# Patient Record
Sex: MALE | Race: WHITE | NOT HISPANIC OR LATINO
[De-identification: names, ages, dates, MRNs, and addresses within clinical notes are randomized per-mention and may not be internally consistent; named-entity substitution may affect disease eponyms.]

---

## 2017-01-04 ENCOUNTER — APPOINTMENT (OUTPATIENT)
Dept: SURGERY | Facility: CLINIC | Age: 62
End: 2017-01-04

## 2017-01-04 VITALS
OXYGEN SATURATION: 98 % | SYSTOLIC BLOOD PRESSURE: 120 MMHG | DIASTOLIC BLOOD PRESSURE: 84 MMHG | HEART RATE: 80 BPM | TEMPERATURE: 98.2 F | RESPIRATION RATE: 16 BRPM

## 2017-01-04 DIAGNOSIS — K63.5 POLYP OF COLON: ICD-10-CM

## 2017-01-04 DIAGNOSIS — K57.90 DIVERTICULOSIS OF INTESTINE, PART UNSPECIFIED, W/OUT PERFORATION OR ABSCESS W/OUT BLEEDING: ICD-10-CM

## 2017-02-02 ENCOUNTER — APPOINTMENT (OUTPATIENT)
Dept: SURGERY | Facility: CLINIC | Age: 62
End: 2017-02-02

## 2017-02-02 VITALS
DIASTOLIC BLOOD PRESSURE: 85 MMHG | SYSTOLIC BLOOD PRESSURE: 119 MMHG | OXYGEN SATURATION: 97 % | HEART RATE: 100 BPM | RESPIRATION RATE: 16 BRPM | TEMPERATURE: 98.4 F

## 2017-03-01 ENCOUNTER — APPOINTMENT (OUTPATIENT)
Dept: ORTHOPEDIC SURGERY | Facility: CLINIC | Age: 62
End: 2017-03-01

## 2017-03-01 VITALS — HEIGHT: 71 IN | BODY MASS INDEX: 39.9 KG/M2 | WEIGHT: 285 LBS

## 2017-03-06 ENCOUNTER — OUTPATIENT (OUTPATIENT)
Dept: OUTPATIENT SERVICES | Facility: HOSPITAL | Age: 62
LOS: 1 days | End: 2017-03-06
Payer: COMMERCIAL

## 2017-03-06 ENCOUNTER — RESULT REVIEW (OUTPATIENT)
Age: 62
End: 2017-03-06

## 2017-03-06 VITALS
SYSTOLIC BLOOD PRESSURE: 119 MMHG | WEIGHT: 281.97 LBS | HEIGHT: 71 IN | RESPIRATION RATE: 20 BRPM | DIASTOLIC BLOOD PRESSURE: 84 MMHG | TEMPERATURE: 98 F | HEART RATE: 87 BPM | OXYGEN SATURATION: 97 %

## 2017-03-06 DIAGNOSIS — Z90.5 ACQUIRED ABSENCE OF KIDNEY: Chronic | ICD-10-CM

## 2017-03-06 DIAGNOSIS — S38.3XXS: ICD-10-CM

## 2017-03-06 DIAGNOSIS — Z98.890 OTHER SPECIFIED POSTPROCEDURAL STATES: Chronic | ICD-10-CM

## 2017-03-06 DIAGNOSIS — G47.33 OBSTRUCTIVE SLEEP APNEA (ADULT) (PEDIATRIC): ICD-10-CM

## 2017-03-06 DIAGNOSIS — I10 ESSENTIAL (PRIMARY) HYPERTENSION: ICD-10-CM

## 2017-03-06 DIAGNOSIS — D49.4 NEOPLASM OF UNSPECIFIED BEHAVIOR OF BLADDER: Chronic | ICD-10-CM

## 2017-03-06 DIAGNOSIS — Z87.11 PERSONAL HISTORY OF PEPTIC ULCER DISEASE: Chronic | ICD-10-CM

## 2017-03-06 DIAGNOSIS — K60.2 ANAL FISSURE, UNSPECIFIED: ICD-10-CM

## 2017-03-06 LAB
ANION GAP SERPL CALC-SCNC: 18 MMOL/L — HIGH (ref 5–17)
BUN SERPL-MCNC: 19 MG/DL — SIGNIFICANT CHANGE UP (ref 7–23)
CALCIUM SERPL-MCNC: 10.2 MG/DL — SIGNIFICANT CHANGE UP (ref 8.4–10.5)
CHLORIDE SERPL-SCNC: 99 MMOL/L — SIGNIFICANT CHANGE UP (ref 96–108)
CO2 SERPL-SCNC: 20 MMOL/L — LOW (ref 22–31)
CREAT SERPL-MCNC: 1.4 MG/DL — HIGH (ref 0.5–1.3)
GLUCOSE SERPL-MCNC: 86 MG/DL — SIGNIFICANT CHANGE UP (ref 70–99)
HCT VFR BLD CALC: 46.9 % — SIGNIFICANT CHANGE UP (ref 39–50)
HCV AB S/CO SERPL IA: 0.17 S/CO — SIGNIFICANT CHANGE UP
HCV AB SERPL-IMP: SIGNIFICANT CHANGE UP
HGB BLD-MCNC: 16.4 G/DL — SIGNIFICANT CHANGE UP (ref 13–17)
HIV 1+2 AB+HIV1 P24 AG SERPL QL IA: SIGNIFICANT CHANGE UP
MCHC RBC-ENTMCNC: 30.3 PG — SIGNIFICANT CHANGE UP (ref 27–34)
MCHC RBC-ENTMCNC: 35 GM/DL — SIGNIFICANT CHANGE UP (ref 32–36)
MCV RBC AUTO: 86.7 FL — SIGNIFICANT CHANGE UP (ref 80–100)
PLATELET # BLD AUTO: 234 K/UL — SIGNIFICANT CHANGE UP (ref 150–400)
POTASSIUM SERPL-MCNC: 3.9 MMOL/L — SIGNIFICANT CHANGE UP (ref 3.5–5.3)
POTASSIUM SERPL-SCNC: 3.9 MMOL/L — SIGNIFICANT CHANGE UP (ref 3.5–5.3)
RBC # BLD: 5.41 M/UL — SIGNIFICANT CHANGE UP (ref 4.2–5.8)
RBC # FLD: 14.7 % — HIGH (ref 10.3–14.5)
SODIUM SERPL-SCNC: 138 MMOL/L — SIGNIFICANT CHANGE UP (ref 135–145)
WBC # BLD: 7.8 K/UL — SIGNIFICANT CHANGE UP (ref 3.8–10.5)
WBC # FLD AUTO: 7.8 K/UL — SIGNIFICANT CHANGE UP (ref 3.8–10.5)

## 2017-03-06 PROCEDURE — 86803 HEPATITIS C AB TEST: CPT

## 2017-03-06 PROCEDURE — 85027 COMPLETE CBC AUTOMATED: CPT

## 2017-03-06 PROCEDURE — 87389 HIV-1 AG W/HIV-1&-2 AB AG IA: CPT

## 2017-03-06 PROCEDURE — 80048 BASIC METABOLIC PNL TOTAL CA: CPT

## 2017-03-06 RX ORDER — LIDOCAINE HCL 20 MG/ML
0.2 VIAL (ML) INJECTION ONCE
Qty: 0 | Refills: 0 | Status: DISCONTINUED | OUTPATIENT
Start: 2017-03-07 | End: 2017-03-22

## 2017-03-06 RX ORDER — SODIUM CHLORIDE 9 MG/ML
3 INJECTION INTRAMUSCULAR; INTRAVENOUS; SUBCUTANEOUS EVERY 8 HOURS
Qty: 0 | Refills: 0 | Status: DISCONTINUED | OUTPATIENT
Start: 2017-03-07 | End: 2017-03-22

## 2017-03-06 NOTE — H&P PST ADULT - PSH
Bladder tumor  s/p surgical excision 2016  History of bleeding ulcers  2014, s/p endoscopy with clipping  History of partial nephrectomy  left 2016  S/P arthroscopy of right knee    S/P left knee arthroscopy

## 2017-03-06 NOTE — H&P PST ADULT - NSANTHOSAYNRD_GEN_A_CORE
No. VEENA screening performed.  STOP BANG Legend: 0-2 = LOW Risk; 3-4 = INTERMEDIATE Risk; 5-8 = HIGH Risk

## 2017-03-06 NOTE — H&P PST ADULT - NEUROLOGICAL DETAILS
alert and oriented x 3/sensation intact/responds to verbal commands/responds to pain/normal strength

## 2017-03-06 NOTE — H&P PST ADULT - HISTORY OF PRESENT ILLNESS
61 year old, obese male, reports experiencing intermittent rectal discomfort and bleeding X 6 months. Presents for sphincterectomy & Partial lateral fissurectomy. 61 year old, obese male, reports experiencing intermittent rectal discomfort and bleeding X 6 months. Presents for sphincterectomy & Partial lateral fissurectomy..  Anal fissure.  Failed Botox injections

## 2017-03-06 NOTE — H&P PST ADULT - PROBLEM SELECTOR PLAN 3
Pt. instructed to take his scheduled dose of edarbyclor morning of procedure with minimal water Pt. instructed to take his scheduled dose of edarbyclor morning of procedure with minimal water. Case reviewed with Dr. Tapia.

## 2017-03-06 NOTE — H&P PST ADULT - PMH
Hyperlipidemia    Hypertension    Hypothyroid    Mass of left thigh  scheduled for biopsy at [facility] next week  VEENA (obstructive sleep apnea)  based on criteria  Renal cancer, left

## 2017-03-07 ENCOUNTER — TRANSCRIPTION ENCOUNTER (OUTPATIENT)
Age: 62
End: 2017-03-07

## 2017-03-07 ENCOUNTER — APPOINTMENT (OUTPATIENT)
Dept: SURGERY | Facility: HOSPITAL | Age: 62
End: 2017-03-07

## 2017-03-07 ENCOUNTER — OUTPATIENT (OUTPATIENT)
Dept: OUTPATIENT SERVICES | Facility: HOSPITAL | Age: 62
LOS: 1 days | End: 2017-03-07
Payer: COMMERCIAL

## 2017-03-07 VITALS
DIASTOLIC BLOOD PRESSURE: 84 MMHG | OXYGEN SATURATION: 98 % | HEART RATE: 87 BPM | SYSTOLIC BLOOD PRESSURE: 119 MMHG | WEIGHT: 281.97 LBS | RESPIRATION RATE: 20 BRPM | HEIGHT: 71 IN | TEMPERATURE: 98 F

## 2017-03-07 VITALS
RESPIRATION RATE: 16 BRPM | DIASTOLIC BLOOD PRESSURE: 86 MMHG | SYSTOLIC BLOOD PRESSURE: 134 MMHG | HEART RATE: 90 BPM | OXYGEN SATURATION: 100 % | TEMPERATURE: 97 F

## 2017-03-07 DIAGNOSIS — D49.4 NEOPLASM OF UNSPECIFIED BEHAVIOR OF BLADDER: Chronic | ICD-10-CM

## 2017-03-07 DIAGNOSIS — Z98.890 OTHER SPECIFIED POSTPROCEDURAL STATES: Chronic | ICD-10-CM

## 2017-03-07 DIAGNOSIS — Z90.5 ACQUIRED ABSENCE OF KIDNEY: Chronic | ICD-10-CM

## 2017-03-07 DIAGNOSIS — Z87.11 PERSONAL HISTORY OF PEPTIC ULCER DISEASE: Chronic | ICD-10-CM

## 2017-03-07 DIAGNOSIS — S38.3XXS: ICD-10-CM

## 2017-03-07 PROCEDURE — 88313 SPECIAL STAINS GROUP 2: CPT

## 2017-03-07 PROCEDURE — C1889: CPT

## 2017-03-07 PROCEDURE — 88313 SPECIAL STAINS GROUP 2: CPT | Mod: 26

## 2017-03-07 PROCEDURE — 88341 IMHCHEM/IMCYTCHM EA ADD ANTB: CPT

## 2017-03-07 PROCEDURE — 46200 REMOVAL OF ANAL FISSURE: CPT

## 2017-03-07 PROCEDURE — 88342 IMHCHEM/IMCYTCHM 1ST ANTB: CPT

## 2017-03-07 PROCEDURE — 88342 IMHCHEM/IMCYTCHM 1ST ANTB: CPT | Mod: 26

## 2017-03-07 PROCEDURE — 88304 TISSUE EXAM BY PATHOLOGIST: CPT | Mod: 26

## 2017-03-07 PROCEDURE — 46275 REMOVE ANAL FIST INTER: CPT

## 2017-03-07 PROCEDURE — 88304 TISSUE EXAM BY PATHOLOGIST: CPT

## 2017-03-07 RX ORDER — SODIUM CHLORIDE 9 MG/ML
1000 INJECTION, SOLUTION INTRAVENOUS
Qty: 0 | Refills: 0 | Status: DISCONTINUED | OUTPATIENT
Start: 2017-03-07 | End: 2017-03-22

## 2017-03-07 RX ORDER — CELECOXIB 200 MG/1
200 CAPSULE ORAL ONCE
Qty: 0 | Refills: 0 | Status: DISCONTINUED | OUTPATIENT
Start: 2017-03-07 | End: 2017-03-22

## 2017-03-07 NOTE — BRIEF OPERATIVE NOTE - POST-OP DX
Abscess of anal or rectal region  03/07/2017    Active  Angelita Domínguez  Fissure, anorectal  03/07/2017    Active  Angelita Domínguez

## 2017-03-07 NOTE — BRIEF OPERATIVE NOTE - PROCEDURE
Drainage of abscess  03/07/2017    Active  [id number]  Fistulotomy, anal  03/07/2017    Active  [id number]  Fissurectomy, with sphincterotomy  03/07/2017    Active  [id number]  Lateral sphincterotomy  03/07/2017    Active  [id number]

## 2017-03-07 NOTE — ASU PATIENT PROFILE, ADULT - PMH
Hyperlipidemia    Hypertension    Hypothyroid    Mass of left thigh  scheduled for biopsy at [facility] next week  VEENA (obstructive sleep apnea)  based on criteria  Renal cancer, left

## 2017-03-07 NOTE — BRIEF OPERATIVE NOTE - OPERATION/FINDINGS
PROCEDURE: Fissurectomy with lateral sphincterotomy, fistulotomy with drainage of chronic abscess and marsupialization    FINDINGS: Patient noted to have small opening of fistulous tract just proximal to fissure.  Fistula probe revealed communication with chronic abscess.  Abscess was drained and cavity marsupialized.

## 2017-03-07 NOTE — ASU DISCHARGE PLAN (ADULT/PEDIATRIC). - NOTIFY
Excessive Diarrhea/Bleeding that does not stop/Unable to Urinate/Inability to Tolerate Liquids or Foods/Persistent Nausea and Vomiting/Pain not relieved by Medications/Fever greater than 101/Swelling that continues

## 2017-03-07 NOTE — ASU DISCHARGE PLAN (ADULT/PEDIATRIC). - MEDICATION SUMMARY - MEDICATIONS TO TAKE
I will START or STAY ON the medications listed below when I get home from the hospital:    oxyCODONE-acetaminophen 5mg-325mg oral tablet  -- 1 tab(s) by mouth every 4-6 hours, As Needed for severe pain MDD:6  -- Caution federal law prohibits the transfer of this drug to any person other  than the person for whom it was prescribed.  May cause drowsiness.  Alcohol may intensify this effect.  Use care when operating dangerous machinery.  This prescription cannot be refilled.  This product contains acetaminophen.  Do not use  with any other product containing acetaminophen to prevent possible liver damage.  Using more of this medication than prescribed may cause serious breathing problems.    -- Indication: For Severe Pain    Lipitor 10 mg oral tablet  -- 1 tab(s) by mouth once a day  -- Indication: For Hyperlipidemia    niacin 500 mg oral capsule  -- 1 cap(s) by mouth once a day  -- Indication: For Supplement    Edarbyclor 40 mg-25 mg oral tablet  -- 1 tab(s) by mouth once a day  -- Indication: For Hypertension    Pepcid 20 mg oral tablet  -- 1 tab(s) by mouth once night before and morning of procedure  -- Indication: For GERD    Co Q-10  -- 300 milligram(s) by mouth once a day  -- Indication: For Supplement    Synthroid 150 mcg (0.15 mg) oral tablet  -- 1 tab(s) by mouth once a day  -- Indication: For Hypothyroidism    Centrum oral tablet  -- 1 tab(s) by mouth once a day  -- Indication: For Supplement    Vitamin D3 1000 intl units oral capsule  -- 1 cap(s) by mouth once a day  -- Indication: For Supplement

## 2017-03-15 ENCOUNTER — FORM ENCOUNTER (OUTPATIENT)
Age: 62
End: 2017-03-15

## 2017-03-15 ENCOUNTER — RESULT REVIEW (OUTPATIENT)
Age: 62
End: 2017-03-15

## 2017-03-15 ENCOUNTER — APPOINTMENT (OUTPATIENT)
Dept: INTERVENTIONAL RADIOLOGY/VASCULAR | Facility: CLINIC | Age: 62
End: 2017-03-15

## 2017-03-15 VITALS
SYSTOLIC BLOOD PRESSURE: 109 MMHG | RESPIRATION RATE: 18 BRPM | OXYGEN SATURATION: 98 % | HEIGHT: 71 IN | HEART RATE: 92 BPM | BODY MASS INDEX: 39.9 KG/M2 | DIASTOLIC BLOOD PRESSURE: 74 MMHG | WEIGHT: 285 LBS

## 2017-03-15 DIAGNOSIS — Z78.9 OTHER SPECIFIED HEALTH STATUS: ICD-10-CM

## 2017-03-15 DIAGNOSIS — Z56.0 UNEMPLOYMENT, UNSPECIFIED: ICD-10-CM

## 2017-03-15 RX ORDER — LEVOTHYROXINE SODIUM 137 UG/1
TABLET ORAL
Refills: 0 | Status: ACTIVE | COMMUNITY

## 2017-03-15 RX ORDER — CALCIUM CARBONATE 260MG(650)
500 TABLET,CHEWABLE ORAL
Refills: 0 | Status: ACTIVE | COMMUNITY

## 2017-03-15 RX ORDER — AZILSARTAN KAMEDOXOMIL AND CHLORTHALIDONE 40; 25 MG/1; MG/1
40-25 TABLET ORAL
Qty: 30 | Refills: 0 | Status: ACTIVE | COMMUNITY
Start: 2016-11-07

## 2017-03-15 RX ORDER — ATORVASTATIN CALCIUM 80 MG/1
TABLET, FILM COATED ORAL
Refills: 0 | Status: ACTIVE | COMMUNITY

## 2017-03-15 SDOH — ECONOMIC STABILITY - INCOME SECURITY: UNEMPLOYMENT, UNSPECIFIED: Z56.0

## 2017-03-16 ENCOUNTER — OUTPATIENT (OUTPATIENT)
Dept: OUTPATIENT SERVICES | Facility: HOSPITAL | Age: 62
LOS: 1 days | End: 2017-03-16
Payer: COMMERCIAL

## 2017-03-16 DIAGNOSIS — Z90.5 ACQUIRED ABSENCE OF KIDNEY: Chronic | ICD-10-CM

## 2017-03-16 DIAGNOSIS — Z87.11 PERSONAL HISTORY OF PEPTIC ULCER DISEASE: Chronic | ICD-10-CM

## 2017-03-16 DIAGNOSIS — Z98.890 OTHER SPECIFIED POSTPROCEDURAL STATES: Chronic | ICD-10-CM

## 2017-03-16 DIAGNOSIS — D49.4 NEOPLASM OF UNSPECIFIED BEHAVIOR OF BLADDER: Chronic | ICD-10-CM

## 2017-03-16 DIAGNOSIS — R22.40 LOCALIZED SWELLING, MASS AND LUMP, UNSPECIFIED LOWER LIMB: ICD-10-CM

## 2017-03-16 LAB — SURGICAL PATHOLOGY STUDY: SIGNIFICANT CHANGE UP

## 2017-03-16 PROCEDURE — 88305 TISSUE EXAM BY PATHOLOGIST: CPT | Mod: 26

## 2017-03-16 PROCEDURE — 20206 BIOPSY MUSCLE PERQ NEEDLE: CPT

## 2017-03-16 PROCEDURE — 77012 CT SCAN FOR NEEDLE BIOPSY: CPT | Mod: 26

## 2017-03-20 LAB — NON-GYNECOLOGICAL CYTOLOGY STUDY: SIGNIFICANT CHANGE UP

## 2017-03-21 DIAGNOSIS — R22.42 LOCALIZED SWELLING, MASS AND LUMP, LEFT LOWER LIMB: ICD-10-CM

## 2017-03-22 ENCOUNTER — APPOINTMENT (OUTPATIENT)
Dept: SURGERY | Facility: CLINIC | Age: 62
End: 2017-03-22

## 2017-03-22 VITALS
RESPIRATION RATE: 18 BRPM | HEART RATE: 77 BPM | OXYGEN SATURATION: 97 % | DIASTOLIC BLOOD PRESSURE: 81 MMHG | TEMPERATURE: 98.6 F | SYSTOLIC BLOOD PRESSURE: 120 MMHG

## 2017-03-22 DIAGNOSIS — Z86.39 PERSONAL HISTORY OF OTHER ENDOCRINE, NUTRITIONAL AND METABOLIC DISEASE: ICD-10-CM

## 2017-03-22 DIAGNOSIS — Z85.51 PERSONAL HISTORY OF MALIGNANT NEOPLASM OF BLADDER: ICD-10-CM

## 2017-03-22 DIAGNOSIS — Z85.528 PERSONAL HISTORY OF OTHER MALIGNANT NEOPLASM OF KIDNEY: ICD-10-CM

## 2017-03-22 DIAGNOSIS — M17.10 UNILATERAL PRIMARY OSTEOARTHRITIS, UNSPECIFIED KNEE: ICD-10-CM

## 2017-03-22 RX ORDER — POLYETHYLENE GLYCOL 3350, SODIUM SULFATE, SODIUM CHLORIDE, POTASSIUM CHLORIDE, ASCORBIC ACID, SODIUM ASCORBATE 7.5-2.691G
100 KIT ORAL
Qty: 1 | Refills: 0 | Status: DISCONTINUED | COMMUNITY
Start: 2016-11-08

## 2017-03-28 ENCOUNTER — APPOINTMENT (OUTPATIENT)
Dept: ORTHOPEDIC SURGERY | Facility: CLINIC | Age: 62
End: 2017-03-28

## 2017-05-04 ENCOUNTER — APPOINTMENT (OUTPATIENT)
Dept: SURGERY | Facility: CLINIC | Age: 62
End: 2017-05-04

## 2017-05-04 VITALS
DIASTOLIC BLOOD PRESSURE: 75 MMHG | HEART RATE: 75 BPM | TEMPERATURE: 97.6 F | RESPIRATION RATE: 16 BRPM | OXYGEN SATURATION: 97 % | SYSTOLIC BLOOD PRESSURE: 109 MMHG

## 2017-05-04 DIAGNOSIS — K60.2 ANAL FISSURE, UNSPECIFIED: ICD-10-CM

## 2017-05-04 DIAGNOSIS — K59.4 ANAL SPASM: ICD-10-CM

## 2017-05-04 DIAGNOSIS — K61.4 INTRASPHINCTERIC ABSCESS: ICD-10-CM

## 2017-08-01 ENCOUNTER — APPOINTMENT (OUTPATIENT)
Dept: ORTHOPEDIC SURGERY | Facility: CLINIC | Age: 62
End: 2017-08-01
Payer: COMMERCIAL

## 2017-08-01 PROCEDURE — 99213 OFFICE O/P EST LOW 20 MIN: CPT

## 2017-08-18 ENCOUNTER — OUTPATIENT (OUTPATIENT)
Dept: OUTPATIENT SERVICES | Facility: HOSPITAL | Age: 62
LOS: 1 days | End: 2017-08-18
Payer: COMMERCIAL

## 2017-08-18 VITALS
HEART RATE: 78 BPM | WEIGHT: 300.05 LBS | DIASTOLIC BLOOD PRESSURE: 80 MMHG | TEMPERATURE: 97 F | SYSTOLIC BLOOD PRESSURE: 106 MMHG | HEIGHT: 70 IN | RESPIRATION RATE: 14 BRPM

## 2017-08-18 DIAGNOSIS — Z98.890 OTHER SPECIFIED POSTPROCEDURAL STATES: Chronic | ICD-10-CM

## 2017-08-18 DIAGNOSIS — Z87.11 PERSONAL HISTORY OF PEPTIC ULCER DISEASE: Chronic | ICD-10-CM

## 2017-08-18 DIAGNOSIS — Z90.5 ACQUIRED ABSENCE OF KIDNEY: Chronic | ICD-10-CM

## 2017-08-18 DIAGNOSIS — D49.4 NEOPLASM OF UNSPECIFIED BEHAVIOR OF BLADDER: Chronic | ICD-10-CM

## 2017-08-18 DIAGNOSIS — D36.9 BENIGN NEOPLASM, UNSPECIFIED SITE: ICD-10-CM

## 2017-08-18 DIAGNOSIS — D21.22 BENIGN NEOPLASM OF CONNECTIVE AND OTHER SOFT TISSUE OF LEFT LOWER LIMB, INCLUDING HIP: ICD-10-CM

## 2017-08-18 DIAGNOSIS — K60.2 ANAL FISSURE, UNSPECIFIED: Chronic | ICD-10-CM

## 2017-08-18 DIAGNOSIS — E66.9 OBESITY, UNSPECIFIED: ICD-10-CM

## 2017-08-18 LAB
BLD GP AB SCN SERPL QL: NEGATIVE — SIGNIFICANT CHANGE UP
BUN SERPL-MCNC: 32 MG/DL — HIGH (ref 7–23)
CALCIUM SERPL-MCNC: 9.6 MG/DL — SIGNIFICANT CHANGE UP (ref 8.4–10.5)
CHLORIDE SERPL-SCNC: 102 MMOL/L — SIGNIFICANT CHANGE UP (ref 98–107)
CO2 SERPL-SCNC: 22 MMOL/L — SIGNIFICANT CHANGE UP (ref 22–31)
CREAT SERPL-MCNC: 1.42 MG/DL — HIGH (ref 0.5–1.3)
GLUCOSE SERPL-MCNC: 94 MG/DL — SIGNIFICANT CHANGE UP (ref 70–99)
HCT VFR BLD CALC: 45.8 % — SIGNIFICANT CHANGE UP (ref 39–50)
HGB BLD-MCNC: 15.5 G/DL — SIGNIFICANT CHANGE UP (ref 13–17)
MCHC RBC-ENTMCNC: 30.4 PG — SIGNIFICANT CHANGE UP (ref 27–34)
MCHC RBC-ENTMCNC: 33.8 % — SIGNIFICANT CHANGE UP (ref 32–36)
MCV RBC AUTO: 89.8 FL — SIGNIFICANT CHANGE UP (ref 80–100)
NRBC # FLD: 0 — SIGNIFICANT CHANGE UP
PLATELET # BLD AUTO: 244 K/UL — SIGNIFICANT CHANGE UP (ref 150–400)
PMV BLD: 10.9 FL — SIGNIFICANT CHANGE UP (ref 7–13)
POTASSIUM SERPL-MCNC: 4.5 MMOL/L — SIGNIFICANT CHANGE UP (ref 3.5–5.3)
POTASSIUM SERPL-SCNC: 4.5 MMOL/L — SIGNIFICANT CHANGE UP (ref 3.5–5.3)
RBC # BLD: 5.1 M/UL — SIGNIFICANT CHANGE UP (ref 4.2–5.8)
RBC # FLD: 14.3 % — SIGNIFICANT CHANGE UP (ref 10.3–14.5)
RH IG SCN BLD-IMP: POSITIVE — SIGNIFICANT CHANGE UP
SODIUM SERPL-SCNC: 140 MMOL/L — SIGNIFICANT CHANGE UP (ref 135–145)
TSH SERPL-MCNC: 7.19 UIU/ML — HIGH (ref 0.27–4.2)
WBC # BLD: 8.36 K/UL — SIGNIFICANT CHANGE UP (ref 3.8–10.5)
WBC # FLD AUTO: 8.36 K/UL — SIGNIFICANT CHANGE UP (ref 3.8–10.5)

## 2017-08-18 PROCEDURE — 93010 ELECTROCARDIOGRAM REPORT: CPT

## 2017-08-18 RX ORDER — NIACIN 50 MG
1 TABLET ORAL
Qty: 0 | Refills: 0 | COMMUNITY

## 2017-08-18 RX ORDER — FAMOTIDINE 10 MG/ML
1 INJECTION INTRAVENOUS
Qty: 0 | Refills: 0 | COMMUNITY

## 2017-08-18 RX ORDER — SODIUM CHLORIDE 9 MG/ML
1000 INJECTION, SOLUTION INTRAVENOUS
Qty: 0 | Refills: 0 | Status: DISCONTINUED | OUTPATIENT
Start: 2017-09-08 | End: 2017-09-09

## 2017-08-18 RX ORDER — CHOLECALCIFEROL (VITAMIN D3) 125 MCG
1 CAPSULE ORAL
Qty: 0 | Refills: 0 | COMMUNITY

## 2017-08-18 NOTE — H&P PST ADULT - NEGATIVE GENERAL SYMPTOMS
no anorexia/no weight loss/no weight gain/no polyphagia/no polyuria/no fever/no sweating/no chills/no malaise/no fatigue/no polydipsia

## 2017-08-18 NOTE — H&P PST ADULT - NEGATIVE CARDIOVASCULAR SYMPTOMS
no chest pain/no peripheral edema/no claudication/no palpitations/no dyspnea on exertion/no orthopnea/no paroxysmal nocturnal dyspnea

## 2017-08-18 NOTE — H&P PST ADULT - NEGATIVE GENERAL GENITOURINARY SYMPTOMS
no dysuria/no bladder infections/no hematuria/no flank pain R/no urinary hesitancy/no flank pain L/normal urinary frequency

## 2017-08-18 NOTE — H&P PST ADULT - RS GEN PE MLT RESP DETAILS PC
no wheezes/no rales/no rhonchi/clear to auscultation bilaterally no rales/no subcutaneous emphysema/no chest wall tenderness/no rhonchi/respirations non-labored/breath sounds equal/clear to auscultation bilaterally/no intercostal retractions/no wheezes/good air movement

## 2017-08-18 NOTE — H&P PST ADULT - PROBLEM SELECTOR PLAN 1
Pt scheduled for left distal thigh exploration wide resection soft tissue mass on 9/8/2017.  labs done results pending, ekg done.  Hibiclens provided.  Preop teaching done, pt able to verbalize understanding

## 2017-08-18 NOTE — H&P PST ADULT - GASTROINTESTINAL DETAILS
soft/nontender nontender/no organomegaly/soft/no rebound tenderness/no rigidity/no guarding/obese/no masses palpable/bowel sounds normal/no bruit

## 2017-08-18 NOTE — H&P PST ADULT - PSH
Bladder tumor  s/p surgical excision 2016  History of bleeding ulcers  2014, s/p endoscopy with clipping  History of partial nephrectomy  left 2016  S/P arthroscopy of right knee    S/P left knee arthroscopy Anal fissure  3/2017  Bladder tumor  s/p surgical excision 2016  History of bleeding ulcers  2014, s/p endoscopy with clipping, secondary to mobic  History of partial nephrectomy  left 2016  S/P arthroscopy of right knee    S/P left knee arthroscopy

## 2017-08-18 NOTE — H&P PST ADULT - HISTORY OF PRESENT ILLNESS
63y/o male scheduled for left distal thigh exploration wide resection soft tissue mass on 9/8/2017.  Pt states, "has left thigh mass for a year, MRI shows mass is growing in size."

## 2017-08-18 NOTE — H&P PST ADULT - PMH
Hyperlipidemia    Hypertension    Hypothyroid    Renal cancer, left  denies chemo and radiation Benign neoplasm    Hyperlipidemia    Hypertension    Hypothyroid    Obese    Renal cancer, left  denies chemo and radiation 8/2016

## 2017-09-07 NOTE — ASU PATIENT PROFILE, ADULT - PSH
Anal fissure  3/2017  Bladder tumor  s/p surgical excision 2016  History of bleeding ulcers  2014, s/p endoscopy with clipping, secondary to mobic  History of partial nephrectomy  left 2016  S/P arthroscopy of right knee    S/P left knee arthroscopy

## 2017-09-07 NOTE — ASU PATIENT PROFILE, ADULT - PMH
Benign neoplasm    Hyperlipidemia    Hypertension    Hypothyroid    Obese    Renal cancer, left  denies chemo and radiation 8/2016

## 2017-09-08 ENCOUNTER — RESULT REVIEW (OUTPATIENT)
Age: 62
End: 2017-09-08

## 2017-09-08 ENCOUNTER — INPATIENT (INPATIENT)
Facility: HOSPITAL | Age: 62
LOS: 0 days | Discharge: HOME | End: 2017-09-09
Attending: ORTHOPAEDIC SURGERY | Admitting: ORTHOPAEDIC SURGERY
Payer: COMMERCIAL

## 2017-09-08 ENCOUNTER — TRANSCRIPTION ENCOUNTER (OUTPATIENT)
Age: 62
End: 2017-09-08

## 2017-09-08 ENCOUNTER — APPOINTMENT (OUTPATIENT)
Dept: ORTHOPEDIC SURGERY | Facility: HOSPITAL | Age: 62
End: 2017-09-08

## 2017-09-08 VITALS
TEMPERATURE: 97 F | HEART RATE: 87 BPM | SYSTOLIC BLOOD PRESSURE: 130 MMHG | RESPIRATION RATE: 18 BRPM | DIASTOLIC BLOOD PRESSURE: 85 MMHG | WEIGHT: 300.05 LBS | HEIGHT: 70 IN | OXYGEN SATURATION: 97 %

## 2017-09-08 DIAGNOSIS — Z98.890 OTHER SPECIFIED POSTPROCEDURAL STATES: Chronic | ICD-10-CM

## 2017-09-08 DIAGNOSIS — K60.2 ANAL FISSURE, UNSPECIFIED: Chronic | ICD-10-CM

## 2017-09-08 DIAGNOSIS — D49.4 NEOPLASM OF UNSPECIFIED BEHAVIOR OF BLADDER: Chronic | ICD-10-CM

## 2017-09-08 DIAGNOSIS — Z87.11 PERSONAL HISTORY OF PEPTIC ULCER DISEASE: Chronic | ICD-10-CM

## 2017-09-08 DIAGNOSIS — D21.22 BENIGN NEOPLASM OF CONNECTIVE AND OTHER SOFT TISSUE OF LEFT LOWER LIMB, INCLUDING HIP: ICD-10-CM

## 2017-09-08 DIAGNOSIS — Z90.5 ACQUIRED ABSENCE OF KIDNEY: Chronic | ICD-10-CM

## 2017-09-08 LAB — RH IG SCN BLD-IMP: POSITIVE — SIGNIFICANT CHANGE UP

## 2017-09-08 PROCEDURE — 88331 PATH CONSLTJ SURG 1 BLK 1SPC: CPT | Mod: 26

## 2017-09-08 PROCEDURE — 88305 TISSUE EXAM BY PATHOLOGIST: CPT | Mod: 26

## 2017-09-08 PROCEDURE — 27364 RESECT THIGH/KNEE TUM 5 CM/>: CPT | Mod: LT

## 2017-09-08 PROCEDURE — 88342 IMHCHEM/IMCYTCHM 1ST ANTB: CPT | Mod: 26

## 2017-09-08 PROCEDURE — 88341 IMHCHEM/IMCYTCHM EA ADD ANTB: CPT | Mod: 26

## 2017-09-08 RX ORDER — OXYCODONE AND ACETAMINOPHEN 5; 325 MG/1; MG/1
1 TABLET ORAL EVERY 4 HOURS
Qty: 0 | Refills: 0 | Status: DISCONTINUED | OUTPATIENT
Start: 2017-09-08 | End: 2017-09-08

## 2017-09-08 RX ORDER — OXYCODONE AND ACETAMINOPHEN 5; 325 MG/1; MG/1
2 TABLET ORAL EVERY 6 HOURS
Qty: 0 | Refills: 0 | Status: DISCONTINUED | OUTPATIENT
Start: 2017-09-08 | End: 2017-09-08

## 2017-09-08 RX ORDER — ONDANSETRON 8 MG/1
4 TABLET, FILM COATED ORAL ONCE
Qty: 0 | Refills: 0 | Status: COMPLETED | OUTPATIENT
Start: 2017-09-08 | End: 2017-09-08

## 2017-09-08 RX ORDER — OXYCODONE HYDROCHLORIDE 5 MG/1
5 TABLET ORAL EVERY 4 HOURS
Qty: 0 | Refills: 0 | Status: DISCONTINUED | OUTPATIENT
Start: 2017-09-08 | End: 2017-09-09

## 2017-09-08 RX ORDER — LEVOTHYROXINE SODIUM 125 MCG
150 TABLET ORAL DAILY
Qty: 0 | Refills: 0 | Status: DISCONTINUED | OUTPATIENT
Start: 2017-09-08 | End: 2017-09-09

## 2017-09-08 RX ORDER — HYDROMORPHONE HYDROCHLORIDE 2 MG/ML
2 INJECTION INTRAMUSCULAR; INTRAVENOUS; SUBCUTANEOUS EVERY 4 HOURS
Qty: 0 | Refills: 0 | Status: DISCONTINUED | OUTPATIENT
Start: 2017-09-08 | End: 2017-09-09

## 2017-09-08 RX ORDER — ASPIRIN/CALCIUM CARB/MAGNESIUM 324 MG
325 TABLET ORAL DAILY
Qty: 0 | Refills: 0 | Status: DISCONTINUED | OUTPATIENT
Start: 2017-09-08 | End: 2017-09-09

## 2017-09-08 RX ORDER — ONDANSETRON 8 MG/1
4 TABLET, FILM COATED ORAL EVERY 6 HOURS
Qty: 0 | Refills: 0 | Status: DISCONTINUED | OUTPATIENT
Start: 2017-09-08 | End: 2017-09-09

## 2017-09-08 RX ORDER — CHLORTHALIDONE 50 MG
25 TABLET ORAL DAILY
Qty: 0 | Refills: 0 | Status: DISCONTINUED | OUTPATIENT
Start: 2017-09-08 | End: 2017-09-09

## 2017-09-08 RX ORDER — MORPHINE SULFATE 50 MG/1
4 CAPSULE, EXTENDED RELEASE ORAL EVERY 4 HOURS
Qty: 0 | Refills: 0 | Status: DISCONTINUED | OUTPATIENT
Start: 2017-09-08 | End: 2017-09-08

## 2017-09-08 RX ORDER — HYDROMORPHONE HYDROCHLORIDE 2 MG/ML
1 INJECTION INTRAMUSCULAR; INTRAVENOUS; SUBCUTANEOUS
Qty: 0 | Refills: 0 | Status: DISCONTINUED | OUTPATIENT
Start: 2017-09-08 | End: 2017-09-08

## 2017-09-08 RX ORDER — AZILSARTAN KAMEDOXOMIL AND CHLORTHALIDONE 40; 12.5 MG/1; MG/1
1 TABLET ORAL
Qty: 0 | Refills: 0 | COMMUNITY

## 2017-09-08 RX ORDER — INFLUENZA VIRUS VACCINE 15; 15; 15; 15 UG/.5ML; UG/.5ML; UG/.5ML; UG/.5ML
0.5 SUSPENSION INTRAMUSCULAR ONCE
Qty: 0 | Refills: 0 | Status: COMPLETED | OUTPATIENT
Start: 2017-09-08 | End: 2017-09-08

## 2017-09-08 RX ORDER — OXYCODONE HYDROCHLORIDE 5 MG/1
10 TABLET ORAL EVERY 4 HOURS
Qty: 0 | Refills: 0 | Status: DISCONTINUED | OUTPATIENT
Start: 2017-09-08 | End: 2017-09-09

## 2017-09-08 RX ORDER — ATORVASTATIN CALCIUM 80 MG/1
10 TABLET, FILM COATED ORAL AT BEDTIME
Qty: 0 | Refills: 0 | Status: DISCONTINUED | OUTPATIENT
Start: 2017-09-08 | End: 2017-09-09

## 2017-09-08 RX ORDER — ACETAMINOPHEN 500 MG
650 TABLET ORAL EVERY 6 HOURS
Qty: 0 | Refills: 0 | Status: DISCONTINUED | OUTPATIENT
Start: 2017-09-08 | End: 2017-09-09

## 2017-09-08 RX ORDER — HYDROMORPHONE HYDROCHLORIDE 2 MG/ML
0.5 INJECTION INTRAMUSCULAR; INTRAVENOUS; SUBCUTANEOUS
Qty: 0 | Refills: 0 | Status: DISCONTINUED | OUTPATIENT
Start: 2017-09-08 | End: 2017-09-09

## 2017-09-08 RX ORDER — LOSARTAN POTASSIUM 100 MG/1
25 TABLET, FILM COATED ORAL DAILY
Qty: 0 | Refills: 0 | Status: DISCONTINUED | OUTPATIENT
Start: 2017-09-08 | End: 2017-09-09

## 2017-09-08 RX ORDER — SODIUM CHLORIDE 9 MG/ML
1000 INJECTION, SOLUTION INTRAVENOUS
Qty: 0 | Refills: 0 | Status: DISCONTINUED | OUTPATIENT
Start: 2017-09-08 | End: 2017-09-09

## 2017-09-08 RX ORDER — HYDROMORPHONE HYDROCHLORIDE 2 MG/ML
0.5 INJECTION INTRAMUSCULAR; INTRAVENOUS; SUBCUTANEOUS
Qty: 0 | Refills: 0 | Status: CANCELLED | OUTPATIENT
Start: 2017-09-16 | End: 2017-09-09

## 2017-09-08 RX ADMIN — OXYCODONE HYDROCHLORIDE 10 MILLIGRAM(S): 5 TABLET ORAL at 19:20

## 2017-09-08 RX ADMIN — HYDROMORPHONE HYDROCHLORIDE 1 MILLIGRAM(S): 2 INJECTION INTRAMUSCULAR; INTRAVENOUS; SUBCUTANEOUS at 11:34

## 2017-09-08 RX ADMIN — Medication 325 MILLIGRAM(S): at 17:44

## 2017-09-08 RX ADMIN — HYDROMORPHONE HYDROCHLORIDE 1 MILLIGRAM(S): 2 INJECTION INTRAMUSCULAR; INTRAVENOUS; SUBCUTANEOUS at 12:02

## 2017-09-08 RX ADMIN — ATORVASTATIN CALCIUM 10 MILLIGRAM(S): 80 TABLET, FILM COATED ORAL at 22:00

## 2017-09-08 RX ADMIN — SODIUM CHLORIDE 30 MILLILITER(S): 9 INJECTION, SOLUTION INTRAVENOUS at 07:53

## 2017-09-08 RX ADMIN — OXYCODONE HYDROCHLORIDE 10 MILLIGRAM(S): 5 TABLET ORAL at 18:27

## 2017-09-08 RX ADMIN — HYDROMORPHONE HYDROCHLORIDE 1 MILLIGRAM(S): 2 INJECTION INTRAMUSCULAR; INTRAVENOUS; SUBCUTANEOUS at 11:49

## 2017-09-08 RX ADMIN — HYDROMORPHONE HYDROCHLORIDE 1 MILLIGRAM(S): 2 INJECTION INTRAMUSCULAR; INTRAVENOUS; SUBCUTANEOUS at 12:56

## 2017-09-08 RX ADMIN — OXYCODONE HYDROCHLORIDE 10 MILLIGRAM(S): 5 TABLET ORAL at 13:44

## 2017-09-08 RX ADMIN — HYDROMORPHONE HYDROCHLORIDE 1 MILLIGRAM(S): 2 INJECTION INTRAMUSCULAR; INTRAVENOUS; SUBCUTANEOUS at 12:03

## 2017-09-08 RX ADMIN — ONDANSETRON 4 MILLIGRAM(S): 8 TABLET, FILM COATED ORAL at 19:30

## 2017-09-08 RX ADMIN — HYDROMORPHONE HYDROCHLORIDE 1 MILLIGRAM(S): 2 INJECTION INTRAMUSCULAR; INTRAVENOUS; SUBCUTANEOUS at 12:41

## 2017-09-08 RX ADMIN — HYDROMORPHONE HYDROCHLORIDE 1 MILLIGRAM(S): 2 INJECTION INTRAMUSCULAR; INTRAVENOUS; SUBCUTANEOUS at 12:19

## 2017-09-08 RX ADMIN — HYDROMORPHONE HYDROCHLORIDE 1 MILLIGRAM(S): 2 INJECTION INTRAMUSCULAR; INTRAVENOUS; SUBCUTANEOUS at 11:48

## 2017-09-08 RX ADMIN — SODIUM CHLORIDE 75 MILLILITER(S): 9 INJECTION, SOLUTION INTRAVENOUS at 11:34

## 2017-09-08 RX ADMIN — OXYCODONE HYDROCHLORIDE 10 MILLIGRAM(S): 5 TABLET ORAL at 13:09

## 2017-09-09 ENCOUNTER — TRANSCRIPTION ENCOUNTER (OUTPATIENT)
Age: 62
End: 2017-09-09

## 2017-09-09 VITALS — WEIGHT: 168.87 LBS

## 2017-09-09 LAB
BASOPHILS # BLD AUTO: 0.02 K/UL — SIGNIFICANT CHANGE UP (ref 0–0.2)
BASOPHILS NFR BLD AUTO: 0.2 % — SIGNIFICANT CHANGE UP (ref 0–2)
BUN SERPL-MCNC: 23 MG/DL — SIGNIFICANT CHANGE UP (ref 7–23)
CALCIUM SERPL-MCNC: 9.3 MG/DL — SIGNIFICANT CHANGE UP (ref 8.4–10.5)
CHLORIDE SERPL-SCNC: 101 MMOL/L — SIGNIFICANT CHANGE UP (ref 98–107)
CO2 SERPL-SCNC: 23 MMOL/L — SIGNIFICANT CHANGE UP (ref 22–31)
CREAT SERPL-MCNC: 1.42 MG/DL — HIGH (ref 0.5–1.3)
EOSINOPHIL # BLD AUTO: 0 K/UL — SIGNIFICANT CHANGE UP (ref 0–0.5)
EOSINOPHIL NFR BLD AUTO: 0 % — SIGNIFICANT CHANGE UP (ref 0–6)
GLUCOSE SERPL-MCNC: 126 MG/DL — HIGH (ref 70–99)
HCT VFR BLD CALC: 43.8 % — SIGNIFICANT CHANGE UP (ref 39–50)
HGB BLD-MCNC: 14.4 G/DL — SIGNIFICANT CHANGE UP (ref 13–17)
IMM GRANULOCYTES # BLD AUTO: 0.05 # — SIGNIFICANT CHANGE UP
IMM GRANULOCYTES NFR BLD AUTO: 0.4 % — SIGNIFICANT CHANGE UP (ref 0–1.5)
LYMPHOCYTES # BLD AUTO: 1.46 K/UL — SIGNIFICANT CHANGE UP (ref 1–3.3)
LYMPHOCYTES # BLD AUTO: 11.7 % — LOW (ref 13–44)
MCHC RBC-ENTMCNC: 29.4 PG — SIGNIFICANT CHANGE UP (ref 27–34)
MCHC RBC-ENTMCNC: 32.9 % — SIGNIFICANT CHANGE UP (ref 32–36)
MCV RBC AUTO: 89.4 FL — SIGNIFICANT CHANGE UP (ref 80–100)
MONOCYTES # BLD AUTO: 1.27 K/UL — HIGH (ref 0–0.9)
MONOCYTES NFR BLD AUTO: 10.1 % — SIGNIFICANT CHANGE UP (ref 2–14)
NEUTROPHILS # BLD AUTO: 9.73 K/UL — HIGH (ref 1.8–7.4)
NEUTROPHILS NFR BLD AUTO: 77.6 % — HIGH (ref 43–77)
NRBC # FLD: 0 — SIGNIFICANT CHANGE UP
PLATELET # BLD AUTO: 250 K/UL — SIGNIFICANT CHANGE UP (ref 150–400)
PMV BLD: 10.5 FL — SIGNIFICANT CHANGE UP (ref 7–13)
POTASSIUM SERPL-MCNC: 4 MMOL/L — SIGNIFICANT CHANGE UP (ref 3.5–5.3)
POTASSIUM SERPL-SCNC: 4 MMOL/L — SIGNIFICANT CHANGE UP (ref 3.5–5.3)
RBC # BLD: 4.9 M/UL — SIGNIFICANT CHANGE UP (ref 4.2–5.8)
RBC # FLD: 14.4 % — SIGNIFICANT CHANGE UP (ref 10.3–14.5)
SODIUM SERPL-SCNC: 141 MMOL/L — SIGNIFICANT CHANGE UP (ref 135–145)
WBC # BLD: 12.53 K/UL — HIGH (ref 3.8–10.5)
WBC # FLD AUTO: 12.53 K/UL — HIGH (ref 3.8–10.5)

## 2017-09-09 RX ORDER — OXYCODONE HYDROCHLORIDE 5 MG/1
1 TABLET ORAL
Qty: 16 | Refills: 0 | OUTPATIENT
Start: 2017-09-09 | End: 2017-09-13

## 2017-09-09 RX ORDER — OXYCODONE HYDROCHLORIDE 5 MG/1
1 TABLET ORAL
Qty: 16 | Refills: 0
Start: 2017-09-09 | End: 2017-09-13

## 2017-09-09 RX ORDER — ASPIRIN/CALCIUM CARB/MAGNESIUM 324 MG
1 TABLET ORAL
Qty: 30 | Refills: 0 | OUTPATIENT
Start: 2017-09-09 | End: 2017-10-09

## 2017-09-09 RX ORDER — ASPIRIN/CALCIUM CARB/MAGNESIUM 324 MG
1 TABLET ORAL
Qty: 30 | Refills: 0
Start: 2017-09-09 | End: 2017-10-09

## 2017-09-09 RX ADMIN — OXYCODONE HYDROCHLORIDE 10 MILLIGRAM(S): 5 TABLET ORAL at 06:00

## 2017-09-09 RX ADMIN — Medication 325 MILLIGRAM(S): at 12:16

## 2017-09-09 RX ADMIN — OXYCODONE HYDROCHLORIDE 10 MILLIGRAM(S): 5 TABLET ORAL at 17:00

## 2017-09-09 RX ADMIN — OXYCODONE HYDROCHLORIDE 10 MILLIGRAM(S): 5 TABLET ORAL at 06:30

## 2017-09-09 RX ADMIN — OXYCODONE HYDROCHLORIDE 10 MILLIGRAM(S): 5 TABLET ORAL at 10:30

## 2017-09-09 RX ADMIN — OXYCODONE HYDROCHLORIDE 10 MILLIGRAM(S): 5 TABLET ORAL at 10:00

## 2017-09-09 RX ADMIN — Medication 150 MICROGRAM(S): at 05:15

## 2017-09-09 NOTE — DISCHARGE NOTE ADULT - NS AS ACTIVITY OBS
Do not drive or operate machinery/Do not make important decisions/Do not drive while taking pain medication

## 2017-09-09 NOTE — DISCHARGE NOTE ADULT - INSTRUCTIONS
Maintain incision clean and dry, call MD with any signs of infection such as fever, redness or drainage from site. You may resume a regular diet Maintain incision clean and dry, call MD with any signs of infection such as fever, redness or drainage from site.  Take over the counter stool softener to prevent constipation that can be a side effect from taking pain medications Maintain incision clean and dry, call MD with any signs of infection such as fever, redness or drainage from site.  Take over the counter stool softener to prevent constipation that can be a side effect from taking pain medications  Walker given to patient.

## 2017-09-09 NOTE — DISCHARGE NOTE ADULT - CARE PLAN
Principal Discharge DX:	Benign neoplasm  Goal:	Resolution of pain  Instructions for follow-up, activity and diet:	Please follow up with Dr. Santana within 1-2 weeks. You may call [phone] to schedule an appointment.    You may resume a regular diet and regular activities. Please do not participate in heavy lifting or strenuous exercise. Do not drive while taking pain medication.    Please go to the emergency department if you experience pain not controlled by pain medication, bleeding that will not stop, fevers or chills.

## 2017-09-09 NOTE — DISCHARGE NOTE ADULT - NS AS DC FOLLOWUP STROKE INST
Smoking Cessation I & D, aspirin, oxycodone I & D, aspirin, oxycodone/Influenza vaccination (VIS Pub Date: August 19, 2014)

## 2017-09-09 NOTE — DISCHARGE NOTE ADULT - PATIENT PORTAL LINK FT
“You can access the Follow[facility] Patient Portal, offered by [facility], by registering with the following website: [email or web address]”

## 2017-09-09 NOTE — DIETITIAN INITIAL EVALUATION ADULT. - OTHER INFO
Pt referred for BMI greater than 40.  At this time, pt states he is eating well, with no issues relating to food allergies, difficulties chewing/swallowing or modified diet PTA.  He relates that he has had a recent 15lb wt gain 2/2 synthroid dosage being too low.  Pt to be discharged today.

## 2017-09-09 NOTE — DISCHARGE NOTE ADULT - PLAN OF CARE
Resolution of pain Please follow up with Dr. Santana within 1-2 weeks. You may call [phone] to schedule an appointment.    You may resume a regular diet and regular activities. Please do not participate in heavy lifting or strenuous exercise. Do not drive while taking pain medication.    Please go to the emergency department if you experience pain not controlled by pain medication, bleeding that will not stop, fevers or chills.

## 2017-09-09 NOTE — DISCHARGE NOTE ADULT - HOSPITAL COURSE
62 year old male presented to [facility] for resection of left thigh mass. Patient underwent left thigh mass resection on 9/8/17. He tolerated the procedure well. Postoperatively, he was tolerating diet, ambulating well and vitals were stable. He was deemed stable for discharge.

## 2017-09-09 NOTE — DOWNTIME INTERRUPTION NOTE - WHICH MANUAL FORMS INITIATED?
See paper chart for clinical documentation recorded during the downtime.  V/S, I/O flowsheet  Accucheck Record  MAR

## 2017-09-09 NOTE — DISCHARGE NOTE ADULT - CONDITIONS AT DISCHARGE
Thigh dressing clean dry and intact. Positive NV status. VS Afebrile. pt james po diet well and voiding without difficulty. seen by MD and cleared for Dc to home as per safe Dc plan.

## 2017-09-09 NOTE — DISCHARGE NOTE ADULT - MEDICATION SUMMARY - MEDICATIONS TO TAKE
I will START or STAY ON the medications listed below when I get home from the hospital:    Rolling Walker  -- 1 unit(s). For use following thigh mass resection  -- Indication: For Walking    oxyCODONE 5 mg oral tablet  -- 1 tab(s) by mouth every 6 hours, As Needed -Mild Pain (1 - 3) MDD:4 per day  -- Indication: For Pain    aspirin 325 mg oral delayed release tablet  -- 1 tab(s) by mouth once a day  -- Indication: For DVT prophylaxis    azilsartan 40 mg oral tablet  -- 1 tab(s) by mouth once a day  -- Indication: For HTN    Lipitor 10 mg oral tablet  -- 1 tab(s) by mouth once a day  -- Indication: For HLD    Edarbyclor 40 mg-25 mg oral tablet  -- 1 tab(s) by mouth once a day  -- Indication: For HTN    chlorthalidone 25 mg oral tablet  -- 1 tab(s) by mouth once a day  -- Indication: For HTN    Co Q-10  -- 300 milligram(s) by mouth once a day  -- Indication: For Supplement    Synthroid 150 mcg (0.15 mg) oral tablet  -- 1 tab(s) by mouth once a day  -- Indication: For Hypothyroidism    Centrum oral tablet  -- 1 tab(s) by mouth once a day  -- Indication: For Supplement

## 2017-09-15 LAB — SURGICAL PATHOLOGY STUDY: SIGNIFICANT CHANGE UP

## 2017-09-16 ENCOUNTER — TRANSCRIPTION ENCOUNTER (OUTPATIENT)
Age: 62
End: 2017-09-16

## 2017-09-19 ENCOUNTER — APPOINTMENT (OUTPATIENT)
Dept: ORTHOPEDIC SURGERY | Facility: CLINIC | Age: 62
End: 2017-09-19
Payer: COMMERCIAL

## 2017-09-19 PROCEDURE — 99024 POSTOP FOLLOW-UP VISIT: CPT

## 2017-12-19 ENCOUNTER — APPOINTMENT (OUTPATIENT)
Dept: ORTHOPEDIC SURGERY | Facility: CLINIC | Age: 62
End: 2017-12-19
Payer: COMMERCIAL

## 2017-12-19 PROCEDURE — 99213 OFFICE O/P EST LOW 20 MIN: CPT

## 2018-07-16 PROBLEM — C64.2 MALIGNANT NEOPLASM OF LEFT KIDNEY, EXCEPT RENAL PELVIS: Chronic | Status: ACTIVE | Noted: 2017-03-06

## 2018-07-16 PROBLEM — G47.33 OBSTRUCTIVE SLEEP APNEA (ADULT) (PEDIATRIC): Chronic | Status: INACTIVE | Noted: 2017-03-06 | Resolved: 2017-08-18

## 2018-07-16 PROBLEM — R22.42 LOCALIZED SWELLING, MASS AND LUMP, LEFT LOWER LIMB: Chronic | Status: INACTIVE | Noted: 2017-03-06 | Resolved: 2017-08-18

## 2018-09-26 PROBLEM — E03.9 HYPOTHYROIDISM, UNSPECIFIED: Chronic | Status: ACTIVE | Noted: 2017-03-06

## 2018-09-26 PROBLEM — E78.5 HYPERLIPIDEMIA, UNSPECIFIED: Chronic | Status: ACTIVE | Noted: 2017-03-06

## 2018-09-26 PROBLEM — I10 ESSENTIAL (PRIMARY) HYPERTENSION: Chronic | Status: ACTIVE | Noted: 2017-03-06

## 2018-09-26 PROBLEM — D36.9 BENIGN NEOPLASM, UNSPECIFIED SITE: Chronic | Status: ACTIVE | Noted: 2017-08-18

## 2018-09-26 PROBLEM — E66.9 OBESITY, UNSPECIFIED: Chronic | Status: ACTIVE | Noted: 2017-08-18

## 2018-10-03 ENCOUNTER — APPOINTMENT (OUTPATIENT)
Dept: ORTHOPEDIC SURGERY | Facility: CLINIC | Age: 63
End: 2018-10-03
Payer: COMMERCIAL

## 2018-10-03 DIAGNOSIS — M79.9 SOFT TISSUE DISORDER, UNSPECIFIED: ICD-10-CM

## 2018-10-03 PROCEDURE — 99213 OFFICE O/P EST LOW 20 MIN: CPT

## 2019-04-18 ENCOUNTER — TRANSCRIPTION ENCOUNTER (OUTPATIENT)
Age: 64
End: 2019-04-18

## 2021-01-07 DIAGNOSIS — Z01.818 ENCOUNTER FOR OTHER PREPROCEDURAL EXAMINATION: ICD-10-CM

## 2021-01-08 ENCOUNTER — APPOINTMENT (OUTPATIENT)
Dept: DISASTER EMERGENCY | Facility: CLINIC | Age: 66
End: 2021-01-08

## 2021-01-10 LAB — SARS-COV-2 N GENE NPH QL NAA+PROBE: NOT DETECTED

## 2021-04-08 ENCOUNTER — OUTPATIENT (OUTPATIENT)
Dept: OUTPATIENT SERVICES | Facility: HOSPITAL | Age: 66
LOS: 1 days | Discharge: HOME | End: 2021-04-08
Payer: COMMERCIAL

## 2021-04-08 VITALS
RESPIRATION RATE: 18 BRPM | SYSTOLIC BLOOD PRESSURE: 118 MMHG | WEIGHT: 279.77 LBS | DIASTOLIC BLOOD PRESSURE: 82 MMHG | HEART RATE: 95 BPM | TEMPERATURE: 98 F | HEIGHT: 71 IN | OXYGEN SATURATION: 96 %

## 2021-04-08 DIAGNOSIS — Z90.5 ACQUIRED ABSENCE OF KIDNEY: Chronic | ICD-10-CM

## 2021-04-08 DIAGNOSIS — M17.12 UNILATERAL PRIMARY OSTEOARTHRITIS, LEFT KNEE: ICD-10-CM

## 2021-04-08 DIAGNOSIS — D49.4 NEOPLASM OF UNSPECIFIED BEHAVIOR OF BLADDER: Chronic | ICD-10-CM

## 2021-04-08 DIAGNOSIS — K60.2 ANAL FISSURE, UNSPECIFIED: Chronic | ICD-10-CM

## 2021-04-08 DIAGNOSIS — Z98.890 OTHER SPECIFIED POSTPROCEDURAL STATES: Chronic | ICD-10-CM

## 2021-04-08 DIAGNOSIS — Z87.11 PERSONAL HISTORY OF PEPTIC ULCER DISEASE: Chronic | ICD-10-CM

## 2021-04-08 DIAGNOSIS — Z01.818 ENCOUNTER FOR OTHER PREPROCEDURAL EXAMINATION: ICD-10-CM

## 2021-04-08 LAB
A1C WITH ESTIMATED AVERAGE GLUCOSE RESULT: 5.8 % — HIGH (ref 4–5.6)
ANION GAP SERPL CALC-SCNC: 8 MMOL/L — SIGNIFICANT CHANGE UP (ref 5–17)
APTT BLD: 38 SEC — HIGH (ref 27.5–35.5)
BLD GP AB SCN SERPL QL: SIGNIFICANT CHANGE UP
BUN SERPL-MCNC: 21 MG/DL — SIGNIFICANT CHANGE UP (ref 7–23)
CALCIUM SERPL-MCNC: 9.9 MG/DL — SIGNIFICANT CHANGE UP (ref 8.5–10.1)
CHLORIDE SERPL-SCNC: 106 MMOL/L — SIGNIFICANT CHANGE UP (ref 96–108)
CO2 SERPL-SCNC: 26 MMOL/L — SIGNIFICANT CHANGE UP (ref 22–31)
CREAT SERPL-MCNC: 1.42 MG/DL — HIGH (ref 0.5–1.3)
ESTIMATED AVERAGE GLUCOSE: 120 MG/DL — HIGH (ref 68–114)
GLUCOSE SERPL-MCNC: 76 MG/DL — SIGNIFICANT CHANGE UP (ref 70–99)
HCT VFR BLD CALC: 48.6 % — SIGNIFICANT CHANGE UP (ref 39–50)
HGB BLD-MCNC: 16.4 G/DL — SIGNIFICANT CHANGE UP (ref 13–17)
INR BLD: 1.07 RATIO — SIGNIFICANT CHANGE UP (ref 0.88–1.16)
MCHC RBC-ENTMCNC: 29.5 PG — SIGNIFICANT CHANGE UP (ref 27–34)
MCHC RBC-ENTMCNC: 33.7 GM/DL — SIGNIFICANT CHANGE UP (ref 32–36)
MCV RBC AUTO: 87.4 FL — SIGNIFICANT CHANGE UP (ref 80–100)
MRSA PCR RESULT.: SIGNIFICANT CHANGE UP
NRBC # BLD: 0 /100 WBCS — SIGNIFICANT CHANGE UP (ref 0–0)
PLATELET # BLD AUTO: 220 K/UL — SIGNIFICANT CHANGE UP (ref 150–400)
POTASSIUM SERPL-MCNC: 3.8 MMOL/L — SIGNIFICANT CHANGE UP (ref 3.5–5.3)
POTASSIUM SERPL-SCNC: 3.8 MMOL/L — SIGNIFICANT CHANGE UP (ref 3.5–5.3)
PROTHROM AB SERPL-ACNC: 12.4 SEC — SIGNIFICANT CHANGE UP (ref 10.6–13.6)
RBC # BLD: 5.56 M/UL — SIGNIFICANT CHANGE UP (ref 4.2–5.8)
RBC # FLD: 14.9 % — HIGH (ref 10.3–14.5)
S AUREUS DNA NOSE QL NAA+PROBE: SIGNIFICANT CHANGE UP
SODIUM SERPL-SCNC: 140 MMOL/L — SIGNIFICANT CHANGE UP (ref 135–145)
WBC # BLD: 8.13 K/UL — SIGNIFICANT CHANGE UP (ref 3.8–10.5)
WBC # FLD AUTO: 8.13 K/UL — SIGNIFICANT CHANGE UP (ref 3.8–10.5)

## 2021-04-08 PROCEDURE — 93010 ELECTROCARDIOGRAM REPORT: CPT

## 2021-04-08 RX ORDER — ATORVASTATIN CALCIUM 80 MG/1
1 TABLET, FILM COATED ORAL
Qty: 0 | Refills: 0 | DISCHARGE

## 2021-04-08 RX ORDER — AZILSARTAN KAMEDOXOMIL 40 MG/1
1 TABLET ORAL
Qty: 0 | Refills: 0 | DISCHARGE

## 2021-04-08 RX ORDER — LEVOTHYROXINE SODIUM 125 MCG
1 TABLET ORAL
Qty: 0 | Refills: 0 | DISCHARGE

## 2021-04-08 RX ORDER — CHLORTHALIDONE 50 MG
1 TABLET ORAL
Qty: 0 | Refills: 0 | DISCHARGE

## 2021-04-08 RX ORDER — MUPIROCIN 20 MG/G
1 OINTMENT TOPICAL
Qty: 22 | Refills: 0
Start: 2021-04-08 | End: 2021-04-12

## 2021-04-08 RX ORDER — AZILSARTAN KAMEDOXOMIL AND CHLORTHALIDONE 40; 12.5 MG/1; MG/1
1 TABLET ORAL
Qty: 0 | Refills: 0 | DISCHARGE

## 2021-04-08 NOTE — PHYSICAL THERAPY INITIAL EVALUATION ADULT - TINETTI GAIT TEST, REHAB EVAL
Patient seen and examined on family centered rounds on 7/8/18 at 8:45am with grandmother at bedside    Agree with PGY-1 note and physical exam as above with the following exceptions / additions:    A/P: John is a 6 year old otherwise healthy male who presents with abdominal pain, diarrhea, fecal incontinence, and unsteady gait for 3 days, though he has been continent of stool since admission and gait is back to baseline. Possible etiologies of his symptoms include infectious diarrhea (bacterial vs. viral) vs. constipation with overflow incontinence vs. spinal cord lesion (less likely due to resolution of symptoms). Patient is hemodynamically stable and overall improved, but requires continued admission for evaluation of possible spinal cord lesion.    1. Diarrhea  - Regular diet  - GI PCR pending  - Strict Is/Os  - Contact isolation    2. Abnormal Gait  - CT head negative  - MRI spine today  - CK wnl  - Neurology consult    3. Urinary and fecal incontinence  - Improved.   - MRI as above  - Post-void residual   - AXR to assess stool burden    4. Nutrition  - Regular diet as tolerated  - Strict Is/Os    Anticipated discharge date:   [ ] Social work needs:  [ ] Case management needs:  [ ] Other discharge needs:    [ x] Reviewed lab results  [ x] Reviewed radiology  [ x] Spoke with parent/guardian  [ x] Spoke with consultant    Huong Castro MD  Pediatric Chief Resident  [phone]
11/12

## 2021-04-08 NOTE — PHYSICAL THERAPY INITIAL EVALUATION ADULT - GENERAL OBSERVATIONS, REHAB EVAL
Patient was seen seated  in chair in PT/OT preoperative assessment room with no apparent distress. Height:  5'11"     ; weight :  280   lbs.

## 2021-04-08 NOTE — H&P PST ADULT - ASSESSMENT
65 year old male with a past medical history of HTN, HLD, and hypothyroidism c/o pain and limited ROM to left knee.  He is scheduled for a robotic assisted left total knee arthroplasty with JHONATHAN on 2021.    CAPRINI SCORE [CLOT]    AGE RELATED RISK FACTORS                                                       MOBILITY RELATED FACTORS  [ ] Age 41-60 years                                            (1 Point)                  [ ] Bed rest                                                        (1 Point)  [ x] Age: 61-74 years                                           (2 Points)                 [ ] Plaster cast                                                   (2 Points)  [ ] Age= 75 years                                              (3 Points)                 [ ] Bed bound for more than 72 hours                 (2 Points)    DISEASE RELATED RISK FACTORS                                               GENDER SPECIFIC FACTORS  [ ] Edema in the lower extremities                       (1 Point)                  [ ] Pregnancy                                                     (1 Point)  [ ] Varicose veins                                               (1 Point)                  [ ] Post-partum < 6 weeks                                   (1 Point)             [x ] BMI > 25 Kg/m2                                            (1 Point)                  [ ] Hormonal therapy  or oral contraception          (1 Point)                 [ ] Sepsis (in the previous month)                        (1 Point)                  [ ] History of pregnancy complications                 (1 point)  [ ] Pneumonia or serious lung disease                                               [ ] Unexplained or recurrent                     (1 Point)           (in the previous month)                               (1 Point)  [ ] Abnormal pulmonary function test                     (1 Point)                 SURGERY RELATED RISK FACTORS  [ ] Acute myocardial infarction                              (1 Point)                 [ ]  Section                                             (1 Point)  [ ] Congestive heart failure (in the previous month)  (1 Point)               [ ] Minor surgery                                                  (1 Point)   [ ] Inflammatory bowel disease                             (1 Point)                 [ ] Arthroscopic surgery                                        (2 Points)  [ ] Central venous access                                      (2 Points)                [ ] General surgery lasting more than 45 minutes   (2 Points)       [ ] Stroke (in the previous month)                          (5 Points)               [ ]x Elective arthroplasty                                         (5 Points)                                                                                                                                               HEMATOLOGY RELATED FACTORS                                                 TRAUMA RELATED RISK FACTORS  [ ] Prior episodes of VTE                                     (3 Points)                [ ] Fracture of the hip, pelvis, or leg                       (5 Points)  [ ] Positive family history for VTE                         (3 Points)                 [ ] Acute spinal cord injury (in the previous month)  (5 Points)  [ ] Prothrombin 97147 A                                     (3 Points)                 [ ] Paralysis  (less than 1 month)                             (5 Points)  [ ] Factor V Leiden                                             (3 Points)                  [ ] Multiple Trauma within 1 month                        (5 Points)  [ ] Lupus anticoagulants                                     (3 Points)                                                           [ ] Anticardiolipin antibodies                               (3 Points)                                                       [ ] High homocysteine in the blood                      (3 Points)                                             [ ] Other congenital or acquired thrombophilia      (3 Points)                                                [ ] Heparin induced thrombocytopenia                  (3 Points)                                          Total Score [     8     ]    Caprini Score 0 - 2:  Low Risk, No VTE Prophylaxis required for most patients, encourage ambulation  Caprini Score 3 - 6:  At Risk, pharmacologic VTE prophylaxis is indicated for most patients (in the absence of a contraindication)  Caprini Score Greater than or = 7:  High Risk, pharmacologic VTE prophylaxis is indicated for most patients (in the absence of a contraindication)    Caprini score indicates that the patient is high risk for VTE event ( score 6 or greater). Surgical patient's in this group will benefit from both pharmacologic prophylaxis and intermittent compression devices . Surgical team will determine the balance between VTE  risk and bleeding risk and other clinical considerations

## 2021-04-08 NOTE — OCCUPATIONAL THERAPY INITIAL EVALUATION ADULT - PERTINENT HX OF CURRENT PROBLEM, REHAB EVAL
L knee OA which impacts pts ability to perform functional tasks and transfers/mobility. Pt is scheduled for L TKA on 4/14/21

## 2021-04-08 NOTE — H&P PST ADULT - HISTORY OF PRESENT ILLNESS
65 year old male with a past medical history of HTN, HLD, and hypothyroidism c/o pain and limited ROM to left knee.  He is scheduled for a robotic assisted left total knee arthroplasty with JHONATHAN on 4/14/2021.    He denies fever, cough, SOB, recent travels, and sick contacts.    Goal: Go up and downstairs.

## 2021-04-08 NOTE — H&P PST ADULT - NSICDXPASTSURGICALHX_GEN_ALL_CORE_FT
PAST SURGICAL HISTORY:  Anal fissure 3/2017    Bladder tumor s/p surgical excision 2016    H/O melanoma excision left arm    History of bleeding ulcers 2014, s/p endoscopy with clipping, secondary to mobic    History of partial nephrectomy left 2016    S/P arthroscopy of right knee     S/P left knee arthroscopy

## 2021-04-08 NOTE — PHYSICAL THERAPY INITIAL EVALUATION ADULT - PERTINENT HX OF CURRENT PROBLEM, REHAB EVAL
Chronic L knee pain and  limiting functional mobility. Pt is scheduled for elective total joint replacement on 4/14/21  by  Dr. Reyes.

## 2021-04-08 NOTE — H&P PST ADULT - NSICDXPASTMEDICALHX_GEN_ALL_CORE_FT
PAST MEDICAL HISTORY:  Benign neoplasm     Hyperlipidemia     Hypertension     Hypothyroid     Obese     Renal cancer, left denies chemo and radiation 8/2016

## 2021-04-08 NOTE — PHYSICAL THERAPY INITIAL EVALUATION ADULT - ADDITIONAL COMMENTS
There are 9 steps, c R rail up,  at the entry of the house and 3 steps, c R rail up, to negotiate at home. Pt has a tub/shower combo c retractable shower head, no grab bar, and regular toilet seat  in BR. Pt is not sure if 3-1 commode will fit in BR but will accept it. Average pain level at rest is 3/10. Pain increases to 8/10 c sit to stand transfer, prolonged standing, ambulation, and stairs negotiation. Pt takes Gabapentin. Pt has no experience of adverse effects with pain medication. Pt is not on out-pt physical therapy at present. There is no h/o fall or knee buckling in the past 6 months. Pt wears glasses for reading and distance and no need for hearing aid. Pt is R handed and drives.

## 2021-04-08 NOTE — H&P PST ADULT - NSICDXPROBLEM_GEN_ALL_CORE_FT
PROBLEM DIAGNOSES  Problem: Preop examination  Assessment and Plan: labs - cbc,pt/ptt,bmp,t&s,nose cx,ekg  M/C required  preop 3 day hibiclens instruction reviewed and given .instructed on if  nose cx positive use mupuricin 5 days and checklist given  take routine meds DOS with sips of water. avoid NSAID and OTC supplements. verbalized understanding  information on proper nutrition , increase protein and better food choices provided in packet     Problem: Unilateral primary osteoarthritis, left knee  Assessment and Plan: robotic assisted left total knee arthroplasty with MAO

## 2021-04-08 NOTE — OCCUPATIONAL THERAPY INITIAL EVALUATION ADULT - ADDITIONAL COMMENTS
Pt lives with wife (Who can assist post op) in a private house with 9 steps to enter with a R rail. Once inside, the pt has 3 steps with a R rail to reach the main floor where the bedroom and bathroom is. The pts bathroom has a tub/shower combination, retractable shower head, standard toilet and no grab bars. The pt reports that a 3/1 commode can fit over the toilet at home. The pt ambulates with no device and owns a rolling walker and a straight cane. The pt daily pain is a 3/10 at rest and a 8/10 with movement. The pt manages the Pt lives with wife (Who can assist post op) in a private house with 9 steps to enter with a R rail. Once inside, the pt has 3 steps with a R rail to reach the main floor where the bedroom and bathroom is. The pts bathroom has a tub/shower combination, retractable shower head, standard toilet and no grab bars. The pt reports that a 3/1 commode can fit over the toilet at home. The pt ambulates with no device and owns a rolling walker and a straight cane. The pt daily pain is a 3/10 at rest and a 8/10 with movement. The pt manages the pain with rest and gabapentin. The pt has no recent outpatient PT, no recent falls and no buckling of the knees. The pt wears glasses all the time, R handed, drives and has no hearing impairments.

## 2021-04-11 ENCOUNTER — APPOINTMENT (OUTPATIENT)
Dept: DISASTER EMERGENCY | Facility: CLINIC | Age: 66
End: 2021-04-11

## 2021-04-11 DIAGNOSIS — Z01.818 ENCOUNTER FOR OTHER PREPROCEDURAL EXAMINATION: ICD-10-CM

## 2021-04-12 LAB — SARS-COV-2 N GENE NPH QL NAA+PROBE: NOT DETECTED

## 2021-04-13 ENCOUNTER — TRANSCRIPTION ENCOUNTER (OUTPATIENT)
Age: 66
End: 2021-04-13

## 2021-04-14 ENCOUNTER — INPATIENT (INPATIENT)
Facility: HOSPITAL | Age: 66
LOS: 0 days | Discharge: HOME HEALTH | End: 2021-04-15
Attending: ORTHOPAEDIC SURGERY | Admitting: ORTHOPAEDIC SURGERY
Payer: COMMERCIAL

## 2021-04-14 ENCOUNTER — TRANSCRIPTION ENCOUNTER (OUTPATIENT)
Age: 66
End: 2021-04-14

## 2021-04-14 ENCOUNTER — RESULT REVIEW (OUTPATIENT)
Age: 66
End: 2021-04-14

## 2021-04-14 VITALS
WEIGHT: 281.97 LBS | OXYGEN SATURATION: 97 % | SYSTOLIC BLOOD PRESSURE: 126 MMHG | HEART RATE: 85 BPM | HEIGHT: 71 IN | RESPIRATION RATE: 16 BRPM | TEMPERATURE: 99 F | DIASTOLIC BLOOD PRESSURE: 84 MMHG

## 2021-04-14 DIAGNOSIS — K60.2 ANAL FISSURE, UNSPECIFIED: Chronic | ICD-10-CM

## 2021-04-14 DIAGNOSIS — Z98.890 OTHER SPECIFIED POSTPROCEDURAL STATES: Chronic | ICD-10-CM

## 2021-04-14 DIAGNOSIS — D49.4 NEOPLASM OF UNSPECIFIED BEHAVIOR OF BLADDER: Chronic | ICD-10-CM

## 2021-04-14 DIAGNOSIS — Z87.11 PERSONAL HISTORY OF PEPTIC ULCER DISEASE: Chronic | ICD-10-CM

## 2021-04-14 DIAGNOSIS — Z90.5 ACQUIRED ABSENCE OF KIDNEY: Chronic | ICD-10-CM

## 2021-04-14 LAB
ANION GAP SERPL CALC-SCNC: 8 MMOL/L — SIGNIFICANT CHANGE UP (ref 5–17)
APTT BLD: 40.3 SEC — HIGH (ref 27.5–35.5)
BUN SERPL-MCNC: 23 MG/DL — SIGNIFICANT CHANGE UP (ref 7–23)
CALCIUM SERPL-MCNC: 8.8 MG/DL — SIGNIFICANT CHANGE UP (ref 8.5–10.1)
CHLORIDE SERPL-SCNC: 107 MMOL/L — SIGNIFICANT CHANGE UP (ref 96–108)
CO2 SERPL-SCNC: 24 MMOL/L — SIGNIFICANT CHANGE UP (ref 22–31)
CREAT SERPL-MCNC: 1.47 MG/DL — HIGH (ref 0.5–1.3)
GLUCOSE SERPL-MCNC: 137 MG/DL — HIGH (ref 70–99)
HCT VFR BLD CALC: 43.8 % — SIGNIFICANT CHANGE UP (ref 39–50)
HGB BLD-MCNC: 14.7 G/DL — SIGNIFICANT CHANGE UP (ref 13–17)
MCHC RBC-ENTMCNC: 29.5 PG — SIGNIFICANT CHANGE UP (ref 27–34)
MCHC RBC-ENTMCNC: 33.6 GM/DL — SIGNIFICANT CHANGE UP (ref 32–36)
MCV RBC AUTO: 87.8 FL — SIGNIFICANT CHANGE UP (ref 80–100)
NRBC # BLD: 0 /100 WBCS — SIGNIFICANT CHANGE UP (ref 0–0)
PLATELET # BLD AUTO: 215 K/UL — SIGNIFICANT CHANGE UP (ref 150–400)
POTASSIUM SERPL-MCNC: 4.1 MMOL/L — SIGNIFICANT CHANGE UP (ref 3.5–5.3)
POTASSIUM SERPL-SCNC: 4.1 MMOL/L — SIGNIFICANT CHANGE UP (ref 3.5–5.3)
RBC # BLD: 4.99 M/UL — SIGNIFICANT CHANGE UP (ref 4.2–5.8)
RBC # FLD: 14.6 % — HIGH (ref 10.3–14.5)
SODIUM SERPL-SCNC: 139 MMOL/L — SIGNIFICANT CHANGE UP (ref 135–145)
WBC # BLD: 9.79 K/UL — SIGNIFICANT CHANGE UP (ref 3.8–10.5)
WBC # FLD AUTO: 9.79 K/UL — SIGNIFICANT CHANGE UP (ref 3.8–10.5)

## 2021-04-14 PROCEDURE — 88311 DECALCIFY TISSUE: CPT | Mod: 26

## 2021-04-14 PROCEDURE — 73560 X-RAY EXAM OF KNEE 1 OR 2: CPT | Mod: 26,LT

## 2021-04-14 PROCEDURE — 88305 TISSUE EXAM BY PATHOLOGIST: CPT | Mod: 26

## 2021-04-14 RX ORDER — ONDANSETRON 8 MG/1
4 TABLET, FILM COATED ORAL EVERY 6 HOURS
Refills: 0 | Status: DISCONTINUED | OUTPATIENT
Start: 2021-04-14 | End: 2021-04-15

## 2021-04-14 RX ORDER — DEXAMETHASONE 0.5 MG/5ML
10 ELIXIR ORAL ONCE
Refills: 0 | Status: COMPLETED | OUTPATIENT
Start: 2021-04-15 | End: 2021-04-15

## 2021-04-14 RX ORDER — HYDROMORPHONE HYDROCHLORIDE 2 MG/ML
0.5 INJECTION INTRAMUSCULAR; INTRAVENOUS; SUBCUTANEOUS ONCE
Refills: 0 | Status: DISCONTINUED | OUTPATIENT
Start: 2021-04-14 | End: 2021-04-15

## 2021-04-14 RX ORDER — HYDROMORPHONE HYDROCHLORIDE 2 MG/ML
0.5 INJECTION INTRAMUSCULAR; INTRAVENOUS; SUBCUTANEOUS
Refills: 0 | Status: DISCONTINUED | OUTPATIENT
Start: 2021-04-14 | End: 2021-04-14

## 2021-04-14 RX ORDER — SODIUM CHLORIDE 9 MG/ML
3 INJECTION INTRAMUSCULAR; INTRAVENOUS; SUBCUTANEOUS EVERY 8 HOURS
Refills: 0 | Status: DISCONTINUED | OUTPATIENT
Start: 2021-04-14 | End: 2021-04-14

## 2021-04-14 RX ORDER — OXYCODONE HYDROCHLORIDE 5 MG/1
5 TABLET ORAL EVERY 4 HOURS
Refills: 0 | Status: DISCONTINUED | OUTPATIENT
Start: 2021-04-14 | End: 2021-04-15

## 2021-04-14 RX ORDER — CEFAZOLIN SODIUM 1 G
3000 VIAL (EA) INJECTION EVERY 8 HOURS
Refills: 0 | Status: COMPLETED | OUTPATIENT
Start: 2021-04-14 | End: 2021-04-15

## 2021-04-14 RX ORDER — POTASSIUM CHLORIDE 20 MEQ
20 PACKET (EA) ORAL DAILY
Refills: 0 | Status: DISCONTINUED | OUTPATIENT
Start: 2021-04-14 | End: 2021-04-15

## 2021-04-14 RX ORDER — ASPIRIN/CALCIUM CARB/MAGNESIUM 324 MG
81 TABLET ORAL
Refills: 0 | Status: DISCONTINUED | OUTPATIENT
Start: 2021-04-15 | End: 2021-04-15

## 2021-04-14 RX ORDER — PANTOPRAZOLE SODIUM 20 MG/1
1 TABLET, DELAYED RELEASE ORAL
Qty: 30 | Refills: 0
Start: 2021-04-14 | End: 2021-05-13

## 2021-04-14 RX ORDER — ATORVASTATIN CALCIUM 80 MG/1
40 TABLET, FILM COATED ORAL AT BEDTIME
Refills: 0 | Status: DISCONTINUED | OUTPATIENT
Start: 2021-04-14 | End: 2021-04-15

## 2021-04-14 RX ORDER — SODIUM CHLORIDE 9 MG/ML
1000 INJECTION, SOLUTION INTRAVENOUS
Refills: 0 | Status: DISCONTINUED | OUTPATIENT
Start: 2021-04-14 | End: 2021-04-15

## 2021-04-14 RX ORDER — BENZOCAINE AND MENTHOL 5; 1 G/100ML; G/100ML
1 LIQUID ORAL EVERY 4 HOURS
Refills: 0 | Status: DISCONTINUED | OUTPATIENT
Start: 2021-04-14 | End: 2021-04-15

## 2021-04-14 RX ORDER — MAGNESIUM HYDROXIDE 400 MG/1
30 TABLET, CHEWABLE ORAL DAILY
Refills: 0 | Status: DISCONTINUED | OUTPATIENT
Start: 2021-04-14 | End: 2021-04-15

## 2021-04-14 RX ORDER — LOSARTAN POTASSIUM 100 MG/1
50 TABLET, FILM COATED ORAL DAILY
Refills: 0 | Status: DISCONTINUED | OUTPATIENT
Start: 2021-04-14 | End: 2021-04-14

## 2021-04-14 RX ORDER — LANOLIN ALCOHOL/MO/W.PET/CERES
3 CREAM (GRAM) TOPICAL AT BEDTIME
Refills: 0 | Status: DISCONTINUED | OUTPATIENT
Start: 2021-04-14 | End: 2021-04-15

## 2021-04-14 RX ORDER — OXYCODONE HYDROCHLORIDE 5 MG/1
2 TABLET ORAL
Qty: 50 | Refills: 0
Start: 2021-04-14 | End: 2021-04-18

## 2021-04-14 RX ORDER — LOSARTAN POTASSIUM 100 MG/1
50 TABLET, FILM COATED ORAL DAILY
Refills: 0 | Status: DISCONTINUED | OUTPATIENT
Start: 2021-04-14 | End: 2021-04-15

## 2021-04-14 RX ORDER — SENNA PLUS 8.6 MG/1
2 TABLET ORAL AT BEDTIME
Refills: 0 | Status: DISCONTINUED | OUTPATIENT
Start: 2021-04-14 | End: 2021-04-15

## 2021-04-14 RX ORDER — PANTOPRAZOLE SODIUM 20 MG/1
40 TABLET, DELAYED RELEASE ORAL
Refills: 0 | Status: DISCONTINUED | OUTPATIENT
Start: 2021-04-14 | End: 2021-04-15

## 2021-04-14 RX ORDER — LEVOTHYROXINE SODIUM 125 MCG
125 TABLET ORAL DAILY
Refills: 0 | Status: DISCONTINUED | OUTPATIENT
Start: 2021-04-14 | End: 2021-04-15

## 2021-04-14 RX ORDER — ATORVASTATIN CALCIUM 80 MG/1
40 TABLET, FILM COATED ORAL AT BEDTIME
Refills: 0 | Status: DISCONTINUED | OUTPATIENT
Start: 2021-04-14 | End: 2021-04-14

## 2021-04-14 RX ORDER — ACETAMINOPHEN 500 MG
1000 TABLET ORAL ONCE
Refills: 0 | Status: COMPLETED | OUTPATIENT
Start: 2021-04-14 | End: 2021-04-14

## 2021-04-14 RX ORDER — ASCORBIC ACID 60 MG
1 TABLET,CHEWABLE ORAL
Qty: 0 | Refills: 0 | DISCHARGE
Start: 2021-04-14

## 2021-04-14 RX ORDER — FOLIC ACID 0.8 MG
1 TABLET ORAL DAILY
Refills: 0 | Status: DISCONTINUED | OUTPATIENT
Start: 2021-04-14 | End: 2021-04-15

## 2021-04-14 RX ORDER — HYDROCHLOROTHIAZIDE 25 MG
25 TABLET ORAL DAILY
Refills: 0 | Status: DISCONTINUED | OUTPATIENT
Start: 2021-04-14 | End: 2021-04-14

## 2021-04-14 RX ORDER — OXYCODONE HYDROCHLORIDE 5 MG/1
10 TABLET ORAL EVERY 4 HOURS
Refills: 0 | Status: DISCONTINUED | OUTPATIENT
Start: 2021-04-14 | End: 2021-04-15

## 2021-04-14 RX ORDER — SODIUM CHLORIDE 9 MG/ML
1000 INJECTION, SOLUTION INTRAVENOUS
Refills: 0 | Status: DISCONTINUED | OUTPATIENT
Start: 2021-04-14 | End: 2021-04-14

## 2021-04-14 RX ORDER — ASCORBIC ACID 60 MG
500 TABLET,CHEWABLE ORAL
Refills: 0 | Status: DISCONTINUED | OUTPATIENT
Start: 2021-04-14 | End: 2021-04-15

## 2021-04-14 RX ORDER — ACETAMINOPHEN 500 MG
650 TABLET ORAL ONCE
Refills: 0 | Status: COMPLETED | OUTPATIENT
Start: 2021-04-14 | End: 2021-04-14

## 2021-04-14 RX ORDER — ONDANSETRON 8 MG/1
4 TABLET, FILM COATED ORAL ONCE
Refills: 0 | Status: DISCONTINUED | OUTPATIENT
Start: 2021-04-14 | End: 2021-04-14

## 2021-04-14 RX ORDER — ASPIRIN/CALCIUM CARB/MAGNESIUM 324 MG
1 TABLET ORAL
Qty: 60 | Refills: 0
Start: 2021-04-14 | End: 2021-05-13

## 2021-04-14 RX ORDER — HYDROCHLOROTHIAZIDE 25 MG
25 TABLET ORAL DAILY
Refills: 0 | Status: DISCONTINUED | OUTPATIENT
Start: 2021-04-14 | End: 2021-04-15

## 2021-04-14 RX ADMIN — OXYCODONE HYDROCHLORIDE 5 MILLIGRAM(S): 5 TABLET ORAL at 17:14

## 2021-04-14 RX ADMIN — SODIUM CHLORIDE 75 MILLILITER(S): 9 INJECTION, SOLUTION INTRAVENOUS at 11:11

## 2021-04-14 RX ADMIN — HYDROMORPHONE HYDROCHLORIDE 0.5 MILLIGRAM(S): 2 INJECTION INTRAMUSCULAR; INTRAVENOUS; SUBCUTANEOUS at 11:10

## 2021-04-14 RX ADMIN — OXYCODONE HYDROCHLORIDE 5 MILLIGRAM(S): 5 TABLET ORAL at 22:28

## 2021-04-14 RX ADMIN — ATORVASTATIN CALCIUM 40 MILLIGRAM(S): 80 TABLET, FILM COATED ORAL at 21:28

## 2021-04-14 RX ADMIN — Medication 650 MILLIGRAM(S): at 07:30

## 2021-04-14 RX ADMIN — Medication 200 MILLIGRAM(S): at 17:14

## 2021-04-14 RX ADMIN — OXYCODONE HYDROCHLORIDE 10 MILLIGRAM(S): 5 TABLET ORAL at 13:04

## 2021-04-14 RX ADMIN — BENZOCAINE AND MENTHOL 1 LOZENGE: 5; 1 LIQUID ORAL at 13:08

## 2021-04-14 RX ADMIN — Medication 1 MILLIGRAM(S): at 13:04

## 2021-04-14 RX ADMIN — OXYCODONE HYDROCHLORIDE 5 MILLIGRAM(S): 5 TABLET ORAL at 18:10

## 2021-04-14 RX ADMIN — SODIUM CHLORIDE 125 MILLILITER(S): 9 INJECTION, SOLUTION INTRAVENOUS at 13:08

## 2021-04-14 RX ADMIN — SODIUM CHLORIDE 125 MILLILITER(S): 9 INJECTION, SOLUTION INTRAVENOUS at 21:28

## 2021-04-14 RX ADMIN — ONDANSETRON 4 MILLIGRAM(S): 8 TABLET, FILM COATED ORAL at 17:46

## 2021-04-14 RX ADMIN — SENNA PLUS 2 TABLET(S): 8.6 TABLET ORAL at 21:28

## 2021-04-14 RX ADMIN — HYDROMORPHONE HYDROCHLORIDE 0.5 MILLIGRAM(S): 2 INJECTION INTRAMUSCULAR; INTRAVENOUS; SUBCUTANEOUS at 11:25

## 2021-04-14 RX ADMIN — Medication 400 MILLIGRAM(S): at 18:43

## 2021-04-14 RX ADMIN — Medication 500 MILLIGRAM(S): at 17:46

## 2021-04-14 RX ADMIN — BENZOCAINE AND MENTHOL 1 LOZENGE: 5; 1 LIQUID ORAL at 17:50

## 2021-04-14 RX ADMIN — Medication 1 TABLET(S): at 13:04

## 2021-04-14 RX ADMIN — Medication 1000 MILLIGRAM(S): at 19:13

## 2021-04-14 RX ADMIN — OXYCODONE HYDROCHLORIDE 10 MILLIGRAM(S): 5 TABLET ORAL at 14:00

## 2021-04-14 RX ADMIN — OXYCODONE HYDROCHLORIDE 5 MILLIGRAM(S): 5 TABLET ORAL at 21:28

## 2021-04-14 NOTE — PROGRESS NOTE ADULT - SUBJECTIVE AND OBJECTIVE BOX
Patient is 65y y/o Male s/p L TKA POD#0  Patient is seen and examined at bedside.   Pt tolerated procedure well without any intra-op complications.    Pain is controlled.  Denies CP/SOB/Dizziness/N/V/D/HA.     Vital Signs Last 24 Hrs  T(C): 36.3 (14 Apr 2021 11:53), Max: 37 (14 Apr 2021 07:13)  T(F): 97.3 (14 Apr 2021 11:53), Max: 98.6 (14 Apr 2021 07:13)  HR: 73 (14 Apr 2021 11:53) (70 - 85)  BP: 117/76 (14 Apr 2021 11:53) (103/59 - 126/84)  BP(mean): 83 (14 Apr 2021 10:48) (83 - 84)  RR: 17 (14 Apr 2021 11:53) (13 - 22)  SpO2: 96% (14 Apr 2021 11:53) (95% - 98%)      PHYSICAL EXAM:  General: A&Ox3 NAD  LLE: Prineo Dressing C/D/I with ACE wrap in place. Motor intact + EHL/FHL/TA/GS.  Sensation is grossly intact.  Extremity warm, compartments soft, compressible. No calf tenderness. DP 2+   RLE: Motor intact +EHL/FHL/TA/GS. Sensation is grossly intact. Extremity warm, compartments soft, compressible. No calf tenderness. DP2+    Labs:                          14.7   9.79  )-----------( 215      ( 14 Apr 2021 10:53 )             43.8       04-14    139  |  107  |  23  ----------------------------<  137<H>  4.1   |  24  |  1.47<H>    Ca    8.8      14 Apr 2021 10:53        A/P: Patient is a 65y y/o Male s/p L TKA, POD # 0  elevated cr: no Celebrex  -wound care, knee extension/leg elevation, cryocuff, isometric exercises, new medications reviewed with pt  -Pain control/analgesia  -Inc spirometry reviewed with pt, demonstrated competence  -DVT prophylaxis with Venodynes/Aspirin 81 BID  -F/U AM Labs  -PT/OT/WBAT  -prophylactic Antibiotic  -medical consult  -DC planning: home tomorrow

## 2021-04-14 NOTE — PHYSICAL THERAPY INITIAL EVALUATION ADULT - PLANNED THERAPY INTERVENTIONS, PT EVAL
Pt will be able to negotiate 7 steps, landing, and 3 steps with R railing left sided cane independently in 2-3 days/balance training/bed mobility training/gait training/strengthening/transfer training

## 2021-04-14 NOTE — PATIENT PROFILE ADULT - ARRIVAL FROM
I Dimitri Root MD have personally  seen and examined the patient today and have noted the findings and formulated the plan of care.  I Dimitri Root MD have personally seen and examined the patient at bedside today at 810 am Home

## 2021-04-14 NOTE — PHYSICAL THERAPY INITIAL EVALUATION ADULT - TRANSFER TRAINING, PT EVAL
Pt will be able to complete a functional transfer independently while maintaining L WB precautions with RW in 2-3 days

## 2021-04-14 NOTE — PHYSICAL THERAPY INITIAL EVALUATION ADULT - ASSISTIVE DEVICE FOR STAIR TRANSFER, REHAB EVAL
3 steps up with R railing and down with L railing using a L sided cane/straight cane/right rail up/left rail down

## 2021-04-14 NOTE — DISCHARGE NOTE PROVIDER - NSDCFUADDINST_GEN_ALL_CORE_FT
Keep knee straight while at rest. Elevate the leg as much as possible ("toes above the nose") to help control swelling. Make sure you get up and take a brief walk every two hours to help with circulation and prevent stiffness. Incentive spirometer 10X/hour. Cryocuff to help with pain/inflammation.     Per Dr. Reyes: may advance from walker as tolerated per discretion of physical therapist.     Keep Prineo Dressing Clean, Dry and Intact. May shower with Prineo Dressing. Please do not scrub, soak, peel or pick at the prineo dressing. No creams, lotions, or oils over dressing. May shower and let water run over incision, no baths. Pat dry once out of shower. Dressing to be removed in office at follow up visit in 2 weeks. There are no staples or stitches that need to be removed.

## 2021-04-14 NOTE — DISCHARGE NOTE PROVIDER - NSDCCPTREATMENT_GEN_ALL_CORE_FT
PRINCIPAL PROCEDURE  Procedure: Arthroplasty, knee, total, robot-assisted, using JHONATHAN system  Findings and Treatment:

## 2021-04-14 NOTE — PHYSICAL THERAPY INITIAL EVALUATION ADULT - CRITERIA FOR SKILLED THERAPEUTIC INTERVENTIONS
Home with home PT, pt has RW, cane, but no 3:1 commode/impairments found/functional limitations in following categories/risk reduction/prevention/rehab potential/therapy frequency/predicted duration of therapy intervention/anticipated equipment needs at discharge/anticipated discharge recommendation Home with home PT, pt has RW, cane,  Pt needs 3:1 commode/impairments found/functional limitations in following categories/risk reduction/prevention/rehab potential/therapy frequency/predicted duration of therapy intervention/anticipated equipment needs at discharge/anticipated discharge recommendation

## 2021-04-14 NOTE — DISCHARGE NOTE PROVIDER - NSDCMRMEDTOKEN_GEN_ALL_CORE_FT
Centrum oral tablet: 1 tab(s) orally once a day  Co Q-10: 300 milligram(s) orally once a day  hydroCHLOROthiazide 25 mg oral tablet: 1 tab(s) orally once a day  levothyroxine 125 mcg (0.125 mg) oral tablet: 1 tab(s) orally once a day  Lipitor 40 mg oral tablet: 1 tab(s) orally once a day  olmesartan 20 mg oral tablet: 1 tab(s) orally once a day  potassium citrate 10 mEq oral tablet, extended release: 2  orally once a day   ascorbic acid 500 mg oral tablet: 1 tab(s) orally 2 times a day  Aspirin Enteric Coated 81 mg oral delayed release tablet: 1 tab(s) orally 2 times a day MDD:2  Centrum oral tablet: 1 tab(s) orally once a day  hydroCHLOROthiazide 25 mg oral tablet: 1 tab(s) orally once a day  HYDROmorphone 4 mg oral tablet: 1 tab(s) orally every 3 hours, As needed, pain 4-10 MDD:8  levothyroxine 125 mcg (0.125 mg) oral tablet: 1 tab(s) orally once a day  Lipitor 40 mg oral tablet: 1 tab(s) orally once a day  olmesartan 20 mg oral tablet: 1 tab(s) orally once a day  pantoprazole 40 mg oral delayed release tablet: 1 tab(s) orally once a day (before a meal) MDD:1  potassium citrate 10 mEq oral tablet, extended release: 2  orally once a day  senna oral tablet: 2 tab(s) orally once a day (at bedtime)  Tylenol 325 mg oral tablet: 2 tab(s) orally every 4 hours

## 2021-04-14 NOTE — BRIEF OPERATIVE NOTE - NSICDXBRIEFPROCEDURE_GEN_ALL_CORE_FT
PROCEDURES:  Arthroplasty, knee, total, robot-assisted, using JHONATHAN system 14-Apr-2021 10:26:21  Elizabeth Parker

## 2021-04-14 NOTE — DISCHARGE NOTE PROVIDER - HOSPITAL COURSE
65yMale with history of Left knee OA presenting for L TKA by Dr. Reyes on 4/14/21. Risk and benefits of surgery were explained to the patient. The patient understood and agreed to proceed with surgery. Patient underwent the procedure with no intraoperative complications. Pt was brought in stable condition to the PACU. Once stable in PACU, pt was brought to the floor. During hospital stay pt was followed by Medicine, physical therapy, Home Care during this admission. Pt had an uneventful hospital course. Pt is stable for discharge to home 65yMale with history of Left knee OA presenting for L TKA by Dr. Reyes on 4/14/21. Risk and benefits of surgery were explained to the patient. The patient understood and agreed to proceed with surgery. Patient underwent the procedure with no intraoperative complications. Pt was brought in stable condition to the PACU. Once stable in PACU, pt was brought to the floor. Patient was on continuous cardiac monitor and pulse ox monitoring for high risk of sleep apnea s/p major surgery. During hospital stay pt was followed by Medicine, physical therapy, Home Care during this admission. Pt had an uneventful hospital course. Pt is stable for discharge to home 65yMale with history of Left knee OA presenting for L TKA by Dr. Reyes on 4/14/21. Risk and benefits of surgery were explained to the patient. The patient understood and agreed to proceed with surgery. Patient underwent the procedure with no intraoperative complications. Pt was brought in stable condition to the PACU. Once stable in PACU, pt was brought to the floor. Patient was on continuous cardiac monitor and pulse ox monitoring for high risk of sleep apnea s/p major surgery. During hospital stay pt was followed by Medicine, physical therapy, Home Care during this admission. Pt had an uneventful hospital course. Pt is stable for discharge to home on POD#1.

## 2021-04-14 NOTE — DISCHARGE NOTE PROVIDER - NSDCFUADDAPPT_GEN_ALL_CORE_FT
Follow up with your surgeon in two weeks. Call for appointment.  If you need more pain medication, call your surgeon's office. For medication refills or authorizations, please call [phone]  We recommend that you call and schedule a follow up appointment within 2-4 weeks with your primary care physician for repeat blood work (CBC and BMP) for post hospital discharge follow-up care.  Call your surgeon if you have increased redness/pain/drainage or fever. Return to ER for shortness of breath/calf tenderness.

## 2021-04-14 NOTE — PHYSICAL THERAPY INITIAL EVALUATION ADULT - ADDITIONAL COMMENTS
Verified by pt, There are 9 steps, c R rail up,  at the entry of the house and 3 steps, c R rail up, to negotiate at home. Pt has a tub/shower combo c retractable shower head, no grab bar, and regular toilet seat  in BR. Pt is not sure if 3-1 commode will fit in BR but will accept it. Average pain level at rest is 3/10. Pain increases to 8/10 c sit to stand transfer, prolonged standing, ambulation, and stairs negotiation. Pt takes Gabapentin. Pt has no experience of adverse effects with pain medication. Pt is not on out-pt physical therapy at present. There is no h/o fall or knee buckling in the past 6 months. Pt wears glasses for reading and distance and no need for hearing aid. Pt is R handed and drives.    During initial PT evaluation, pt stated that he can use his garage steps to enter the house which has 7 steps with R railing. Verified by pt, There are 9 steps, c R rail up,  at the entry of the house and 3 steps, c R rail up, to negotiate at home. Pt has a tub/shower combo c retractable shower head, no grab bar, and regular toilet seat  in BR. Pt is not sure if 3-1 commode will fit in BR but will accept it. Average pain level at rest is 3/10. Pain increases to 8/10 c sit to stand transfer, prolonged standing, ambulation, and stairs negotiation. Pt takes Gabapentin. Pt has no experience of adverse effects with pain medication. Pt is not on out-pt physical therapy at present. There is no h/o fall or knee buckling in the past 6 months. Pt wears glasses for reading and distance and no need for hearing aid. Pt is R handed and drives.    During initial post op PT evaluation, pt stated that he can use his garage steps to enter the house which has 7 steps with R railing.

## 2021-04-14 NOTE — PHYSICAL THERAPY INITIAL EVALUATION ADULT - STRENGTHENING, PT EVAL
Pt will be able to improve in strength in L LE 5/5 with RW independently while maintaining L WB precautions in order to complete gait and ADL's in 1-2 weeks

## 2021-04-14 NOTE — PHYSICAL THERAPY INITIAL EVALUATION ADULT - BALANCE TRAINING, PT EVAL
Pt will be able to maintain good static and dynamic balance with RW while maintaining L WB precautions in order to complete ADL's and gait independently in 1-2 weeks Pt will be able to maintain static and dynamic balance to good  with RW while maintaining L WB precautions in order to complete ADL's and gait independently in 1-2 weeks

## 2021-04-14 NOTE — DISCHARGE NOTE PROVIDER - CARE PROVIDER_API CALL
Kenneth Reyes)  Orthopaedic Surgery  [address] [place] [address]  Phone: [phone]  Fax: [phone]  Follow Up Time:

## 2021-04-14 NOTE — CONSULT NOTE ADULT - SUBJECTIVE AND OBJECTIVE BOX
ERIK CONTRERAS is a 65y Male s/p ROBOTIC ASSISTED LEFT TOTAL KNEE ARTHROPLASTY WITH JHONATHAN      w/ h/o Hypothyroid    Hyperlipidemia    Hypertension    Renal cancer, left    Mass of left thigh    VEENA (obstructive sleep apnea)    Obese    Benign neoplasm      denies any chest pain shortness of breath palpitation dizziness lightheadedness nausea vomiting fever or chills    History of partial nephrectomy    Bladder tumor    History of bleeding ulcers    S/P left knee arthroscopy    S/P arthroscopy of right knee    Anal fissure    H/O melanoma excision      No pertinent family history in first degree relatives      SH: doesnot smoke or drink at this time    morphine (Nausea)  No Known Allergies    ascorbic acid 500 milliGRAM(s) Oral two times a day  atorvastatin 40 milliGRAM(s) Oral at bedtime  benzocaine 15 mG/menthol 3.6 mG (Sugar-Free) Lozenge 1 Lozenge Oral every 4 hours PRN  ceFAZolin   IVPB 3000 milliGRAM(s) IV Intermittent every 8 hours  folic acid 1 milliGRAM(s) Oral daily  hydrochlorothiazide 25 milliGRAM(s) Oral daily  HYDROmorphone  Injectable 0.5 milliGRAM(s) IV Push once  lactated ringers. 1000 milliLiter(s) IV Continuous <Continuous>  levothyroxine 125 MICROGram(s) Oral daily  losartan 50 milliGRAM(s) Oral daily  magnesium hydroxide Suspension 30 milliLiter(s) Oral daily PRN  melatonin 3 milliGRAM(s) Oral at bedtime PRN  multivitamin 1 Tablet(s) Oral daily  ondansetron Injectable 4 milliGRAM(s) IV Push every 6 hours PRN  oxyCODONE    IR 5 milliGRAM(s) Oral every 4 hours  oxyCODONE    IR 5 milliGRAM(s) Oral every 4 hours PRN  oxyCODONE    IR 10 milliGRAM(s) Oral every 4 hours PRN  pantoprazole    Tablet 40 milliGRAM(s) Oral before breakfast  potassium chloride    Tablet ER 20 milliEquivalent(s) Oral daily  senna 2 Tablet(s) Oral at bedtime    T(C): 36.8 (04-14-21 @ 18:49), Max: 37 (04-14-21 @ 07:13)  HR: 103 (04-14-21 @ 18:49) (70 - 103)  BP: 103/70 (04-14-21 @ 18:49) (103/59 - 126/84)  RR: 15 (04-14-21 @ 18:49) (13 - 22)  SpO2: 96% (04-14-21 @ 18:49) (94% - 98%)  HEENT unremarkable  neck no JVD or bruit  heart normal S1 S2 RRR no gallops or rubs  chest clear to auscultation  abd sof nontender non distended +bs  ext no calf tenderness    A/P   DVT PX  pain control  bowel regimen   wound care as per ortho  GI PX  antiemetics prn  incentive spirometer

## 2021-04-14 NOTE — PHYSICAL THERAPY INITIAL EVALUATION ADULT - GAIT TRAINING, PT EVAL
Pt will be able to ambulate 500 feet independently while maintaining L WB precautions with RW in 2-3 days

## 2021-04-15 ENCOUNTER — TRANSCRIPTION ENCOUNTER (OUTPATIENT)
Age: 66
End: 2021-04-15

## 2021-04-15 VITALS — TEMPERATURE: 99 F

## 2021-04-15 LAB
ANION GAP SERPL CALC-SCNC: 6 MMOL/L — SIGNIFICANT CHANGE UP (ref 5–17)
BUN SERPL-MCNC: 20 MG/DL — SIGNIFICANT CHANGE UP (ref 7–23)
CALCIUM SERPL-MCNC: 8.9 MG/DL — SIGNIFICANT CHANGE UP (ref 8.5–10.1)
CHLORIDE SERPL-SCNC: 103 MMOL/L — SIGNIFICANT CHANGE UP (ref 96–108)
CO2 SERPL-SCNC: 27 MMOL/L — SIGNIFICANT CHANGE UP (ref 22–31)
CREAT SERPL-MCNC: 1.54 MG/DL — HIGH (ref 0.5–1.3)
GLUCOSE SERPL-MCNC: 133 MG/DL — HIGH (ref 70–99)
HCT VFR BLD CALC: 42.9 % — SIGNIFICANT CHANGE UP (ref 39–50)
HGB BLD-MCNC: 14.5 G/DL — SIGNIFICANT CHANGE UP (ref 13–17)
MCHC RBC-ENTMCNC: 29.7 PG — SIGNIFICANT CHANGE UP (ref 27–34)
MCHC RBC-ENTMCNC: 33.8 GM/DL — SIGNIFICANT CHANGE UP (ref 32–36)
MCV RBC AUTO: 87.9 FL — SIGNIFICANT CHANGE UP (ref 80–100)
NRBC # BLD: 0 /100 WBCS — SIGNIFICANT CHANGE UP (ref 0–0)
PLATELET # BLD AUTO: 240 K/UL — SIGNIFICANT CHANGE UP (ref 150–400)
POTASSIUM SERPL-MCNC: 4 MMOL/L — SIGNIFICANT CHANGE UP (ref 3.5–5.3)
POTASSIUM SERPL-SCNC: 4 MMOL/L — SIGNIFICANT CHANGE UP (ref 3.5–5.3)
RBC # BLD: 4.88 M/UL — SIGNIFICANT CHANGE UP (ref 4.2–5.8)
RBC # FLD: 14.9 % — HIGH (ref 10.3–14.5)
SODIUM SERPL-SCNC: 136 MMOL/L — SIGNIFICANT CHANGE UP (ref 135–145)
WBC # BLD: 13.77 K/UL — HIGH (ref 3.8–10.5)
WBC # FLD AUTO: 13.77 K/UL — HIGH (ref 3.8–10.5)

## 2021-04-15 RX ORDER — HYDROMORPHONE HYDROCHLORIDE 2 MG/ML
1 INJECTION INTRAMUSCULAR; INTRAVENOUS; SUBCUTANEOUS
Refills: 0 | Status: DISCONTINUED | OUTPATIENT
Start: 2021-04-15 | End: 2021-04-15

## 2021-04-15 RX ORDER — SENNA PLUS 8.6 MG/1
2 TABLET ORAL
Qty: 0 | Refills: 0 | DISCHARGE
Start: 2021-04-15

## 2021-04-15 RX ORDER — UBIDECARENONE 100 MG
300 CAPSULE ORAL
Qty: 0 | Refills: 0 | DISCHARGE

## 2021-04-15 RX ORDER — HYDROMORPHONE HYDROCHLORIDE 2 MG/ML
1 INJECTION INTRAMUSCULAR; INTRAVENOUS; SUBCUTANEOUS
Qty: 40 | Refills: 0
Start: 2021-04-15 | End: 2021-04-19

## 2021-04-15 RX ORDER — HYDROMORPHONE HYDROCHLORIDE 2 MG/ML
4 INJECTION INTRAMUSCULAR; INTRAVENOUS; SUBCUTANEOUS
Refills: 0 | Status: DISCONTINUED | OUTPATIENT
Start: 2021-04-15 | End: 2021-04-15

## 2021-04-15 RX ORDER — SODIUM CHLORIDE 9 MG/ML
1000 INJECTION INTRAMUSCULAR; INTRAVENOUS; SUBCUTANEOUS ONCE
Refills: 0 | Status: COMPLETED | OUTPATIENT
Start: 2021-04-15 | End: 2021-04-15

## 2021-04-15 RX ADMIN — Medication 1 TABLET(S): at 11:56

## 2021-04-15 RX ADMIN — Medication 200 MILLIGRAM(S): at 02:55

## 2021-04-15 RX ADMIN — SODIUM CHLORIDE 125 MILLILITER(S): 9 INJECTION, SOLUTION INTRAVENOUS at 05:36

## 2021-04-15 RX ADMIN — LOSARTAN POTASSIUM 50 MILLIGRAM(S): 100 TABLET, FILM COATED ORAL at 05:37

## 2021-04-15 RX ADMIN — Medication 102 MILLIGRAM(S): at 05:37

## 2021-04-15 RX ADMIN — SODIUM CHLORIDE 500 MILLILITER(S): 9 INJECTION INTRAMUSCULAR; INTRAVENOUS; SUBCUTANEOUS at 07:47

## 2021-04-15 RX ADMIN — Medication 1 MILLIGRAM(S): at 11:56

## 2021-04-15 RX ADMIN — Medication 500 MILLIGRAM(S): at 05:37

## 2021-04-15 RX ADMIN — Medication 20 MILLIEQUIVALENT(S): at 11:56

## 2021-04-15 RX ADMIN — Medication 125 MICROGRAM(S): at 05:37

## 2021-04-15 RX ADMIN — OXYCODONE HYDROCHLORIDE 5 MILLIGRAM(S): 5 TABLET ORAL at 05:37

## 2021-04-15 RX ADMIN — PANTOPRAZOLE SODIUM 40 MILLIGRAM(S): 20 TABLET, DELAYED RELEASE ORAL at 07:52

## 2021-04-15 RX ADMIN — HYDROMORPHONE HYDROCHLORIDE 4 MILLIGRAM(S): 2 INJECTION INTRAMUSCULAR; INTRAVENOUS; SUBCUTANEOUS at 10:11

## 2021-04-15 RX ADMIN — Medication 81 MILLIGRAM(S): at 05:37

## 2021-04-15 RX ADMIN — OXYCODONE HYDROCHLORIDE 5 MILLIGRAM(S): 5 TABLET ORAL at 02:56

## 2021-04-15 RX ADMIN — HYDROMORPHONE HYDROCHLORIDE 1 MILLIGRAM(S): 2 INJECTION INTRAMUSCULAR; INTRAVENOUS; SUBCUTANEOUS at 08:15

## 2021-04-15 RX ADMIN — HYDROMORPHONE HYDROCHLORIDE 4 MILLIGRAM(S): 2 INJECTION INTRAMUSCULAR; INTRAVENOUS; SUBCUTANEOUS at 09:11

## 2021-04-15 RX ADMIN — HYDROMORPHONE HYDROCHLORIDE 1 MILLIGRAM(S): 2 INJECTION INTRAMUSCULAR; INTRAVENOUS; SUBCUTANEOUS at 07:52

## 2021-04-15 RX ADMIN — OXYCODONE HYDROCHLORIDE 5 MILLIGRAM(S): 5 TABLET ORAL at 06:20

## 2021-04-15 RX ADMIN — Medication 25 MILLIGRAM(S): at 05:37

## 2021-04-15 RX ADMIN — OXYCODONE HYDROCHLORIDE 5 MILLIGRAM(S): 5 TABLET ORAL at 03:56

## 2021-04-15 NOTE — OCCUPATIONAL THERAPY INITIAL EVALUATION ADULT - RANGE OF MOTION EXAMINATION, LOWER EXTREMITY
AAROM in left knee is 0-45 degrees with pain/Right LE Active ROM was WNL(within normal limits)/Right LE Passive ROM was WNL (within normal limits)

## 2021-04-15 NOTE — OCCUPATIONAL THERAPY INITIAL EVALUATION ADULT - PLANNED THERAPY INTERVENTIONS, OT EVAL
energy conservation techniques/ADL retraining/IADL retraining/balance training/bed mobility training/parent/caregiver training.../ROM/strengthening/transfer training

## 2021-04-15 NOTE — DISCHARGE NOTE NURSING/CASE MANAGEMENT/SOCIAL WORK - PATIENT PORTAL LINK FT
You can access the FollowMyHealth Patient Portal offered by [facility] by registering at the following website: [email or web address]. By joining [facility]’s FollowMyHealth portal, you will also be able to view your health information using other applications (apps) compatible with our system.

## 2021-04-15 NOTE — OCCUPATIONAL THERAPY INITIAL EVALUATION ADULT - ANTICIPATED DISCHARGE DISPOSITION, OT EVAL
Recommend home with OT referral to enable patient to safely perform ADL management and functional mobility. Pt reports he has a 3-in-1 commode, rolling walker and SAC

## 2021-04-15 NOTE — OCCUPATIONAL THERAPY INITIAL EVALUATION ADULT - SHARP/DULL DISCRIMINATION, RLE, REHAB EVAL
Throat Pain/Nasal Congestion





- HPI Summary


HPI Summary: 





This pt is a 31 y/o F presenting to [facility] accompanied by her family with a CC 

of a constant sickness that has been reoccurring for the past couple months. 

She stated that it started with bronchitis and then became a sinus infection. 

She states that she has been unable to swallow foods and states that she chokes 

on her own spit. She also reports of throat pain that is rated a 6/10 in 

severity. She states that she has had a fever and sweats while at home for the 

past week on and off. She states that her temperature before arrival was 100 F. 

She also had a persistent cough for the duration of her infection. She also 

states that she has been unable to sleep and eat through the duration of her 

sickness. She denies any CP, abdominal pain, N/V/D, and new rashes. She was 

sent here from urgent care today for a course of ABX treatment and a CT scan of 

her throat. She states no aggravating or alleviating factors. She has a Hx of 

DM which has been controlled by a change in her diet. 





- History of Current Complaint


Chief Complaint: EDThroatPain


Time Seen by Provider: 07/08/19 19:41


Hx Obtained From: Patient


Onset/Duration: Gradual Onset - month ago, Lasting Weeks, Still Present


Severity: Mild


Cough: Nonproductive





- Allergies/Home Medications


Allergies/Adverse Reactions: 


 Allergies











Allergy/AdvReac Type Severity Reaction Status Date / Time


 


bupropion [From Wellbutrin] Allergy  See Comment Verified 07/08/19 18:45


 


carisoprodol [From Soma] Allergy  Vertigo Verified 07/08/19 18:45


 


ciprofloxacin Allergy  Hives Verified 07/08/19 18:45


 


clindamycin Allergy  Hives Verified 07/08/19 18:45


 


diphenhydramine Allergy  See Comment Verified 07/08/19 18:45





[From Benadryl]     


 


latex Allergy  Rash Verified 07/08/19 18:45


 


Penicillins Allergy  Nausea Verified 07/08/19 18:45


 


SSRI Allergy  Unknown Uncoded 07/08/19 18:45





   Reaction  





   Details  














PMH/Surg Hx/FS Hx/Imm Hx


Previously Healthy: No


Endocrine/Hematology History: 


   Denies: Hx Anticoagulant Therapy, Hx Diabetes, Hx Thyroid Disease


Cardiovascular History: Reports: Other Cardiovascular Problems/Disorders - 

Heart Murmur


   Denies: Hx Cardiac Arrest, Hx Hypertension


Respiratory History: Reports: Hx Asthma - Mild exercise induced


   Denies: Hx Chronic Obstructive Pulmonary Disease (COPD)


GI History: 


   Denies: Hx Ulcer


 History: Reports: Other  Problems/Disorders - PCOS


   Denies: Hx Dialysis


Musculoskeletal History: Reports: Hx Fibromyalgia


Sensory History: 


   Denies: Hx Deafness


Neurological History: Reports: Other Neuro Impairments/Disorders - un dx 

seizures 


   Denies: Hx CVA


Psychiatric History: Reports: Other Psychiatric Issues/Disorders - Panic attack





- Surgical History


Surgery Procedure, Year, and Place: Cyst removal back of head 2008, Gallbladder 

removal, ganglion cyst removed from left wrist, tonsillectomy 1989.





- Immunization History


Date of Tetanus Vaccine: unknown


Date of Influenza Vaccine: none


Infectious Disease History: No


Infectious Disease History: 


   Denies: Hx Clostridium Difficile, Hx Hepatitis, Hx Human Immunodeficiency 

Virus (HIV), Hx of Known/Suspected MRSA, Hx Shingles, Hx Tuberculosis, Hx Known/

Suspected VRSA, History Other Infectious Disease, Traveled Outside the [place] in 

Last 30 Days





- Family History


Known Family History: Positive: Cardiac Disease





- Social History


Occupation: Unemployed, Disabled


Lives: Alone


Alcohol Use: None


Hx Substance Use: No


Substance Use Type: Reports: None


Substance Use Comment - Amount & Last Used: one inhalation 2 weeks ago


Hx Tobacco Use: No


Smoking Status (MU): Never Smoked Tobacco


Household Exposure: Yes





Review of Systems


Positive: Fever - 100 F, Skin Diaphoresis


Positive: Sore Throat, Other - inability to swallow, chokes on her own spit


Negative: Chest Pain


Negative: Abdominal Pain, Vomiting, Diarrhea, Nausea


Negative: Rash


All Other Systems Reviewed And Are Negative: Yes





Physical Exam





- Summary


Physical Exam Summary: 





Appearance: Well-appearing, Well-nourished, lying in bed comfortably


Skin: Warm, dry, no obvious rash


Eyes: sclera anicteric, no conjunctival pallor


ENT: mucous membranes moist, pharynx appears normal


Neck: Fairly marked anterior and posterior adenopathy, oropharynx appears normal


Respiratory: Clear to auscultation, no signs of respiratory distress


Cardiovascular: Normal S1, S2. No murmurs. Normal distal pulses in tibial and 

radial bilaterally.


Abdomen: Soft, nontender, normal active bowel sounds present


Musculoskeletal: Normal, Strength/ROM Intact


Neurological: A&Ox3, awake and alert, mentation is normal, speech is fluent and 

appropriate


Psychiatric: affect is normal, does not appear anxious or depressed





Triage Information Reviewed: Yes


Vital Signs On Initial Exam: 


 Initial Vitals











Temp Pulse Resp BP Pulse Ox


 


 99.1 F   116   22   156/108   98 


 


 07/08/19 18:43  07/08/19 18:43  07/08/19 18:43  07/08/19 18:43  07/08/19 18:43











Vital Signs Reviewed: Yes





Diagnostics





- Vital Signs


 Vital Signs











  Temp Pulse Resp BP Pulse Ox


 


 07/08/19 18:43  99.1 F  116  22  156/108  98














- Laboratory


Result Diagrams: 


 07/08/19 20:33





 07/08/19 20:33


Lab Statement: Any lab studies that have been ordered have been reviewed, and 

results considered in the medical decision making process.





- CT


  ** Neck CT


CT Interpretation Completed By: Radiologist


Summary of CT Findings: 1. There is a masslike appearance at the base of the 

tongue extending down to.  the epiglottis involving an area measuring 3.7 x 3.6 

x 2.1 cm. While this may.  be inflammation or infection of the lingual tonsils, 

there is not significant.  stranding in the adjacent fat to help confirm this 

and solid mass is not.  excluded here. Consider direct visualization. No 

abscess.  2. There is fairly extensive cervical and supraclavicular 

lymphadenopathy.  ED Physician has reviewed this report.





EENT Course/Dx





- Course


Course Of Treatment: This pt is a 31 y/o F presenting to [facility] accompanied by 

her family with a CC of a constant sickness that has been reoccurring for the 

past couple months. She stated that it started with bronchitis and then became 

a sinus infection.  She was recommended here from urgent care who states that 

she should receive a CT of her neck and IV ABX.  Her PE found that she has 

Fairly marked anterior and posterior adenopathy, oropharynx appears normal.  

Her neck CT showed.  1. There is a masslike appearance at the base of the 

tongue extending down to.  the epiglottis involving an area measuring 3.7 x 3.6 

x 2.1 cm. While this may.  be inflammation or infection of the lingual tonsils, 

there is not significant.  stranding in the adjacent fat to help confirm this 

and solid mass is not.  excluded here. Consider direct visualization. No 

abscess.  2. There is fairly extensive cervical and supraclavicular 

lymphadenopathy.  She had abnormal lab values in Urine protein, ketones, blood, 

RBC and Squamous Epith cells. She also had abnormalities in her C-Reactive 

proteins.  She received morphine, Torodal, Iodixanol, and ceftriaxone sodium 

during her ED course.  She will be discharged home with a Dx of pharyngitis and 

given ABX treatment of Ceftin and Oxycodone oral tablets and instructed to 

follow up with an ENT doctor.  I did explain that there was a possibility that 

the CT findings represented a tumor or cancer and that she should have a follow-

up exam by an ENT doctor.





- Differential Diagnoses


Differential Diagnoses: Pharyngitis





- Diagnoses


Provider Diagnoses: 


 Pharyngitis








Discharge





- Sign-Out/Discharge


Documenting (check all that apply): Patient Departure - discharge


Patient Received Moderate/Deep Sedation with Procedure: No





- Discharge Plan


Condition: Good


Disposition: HOME


Patient Education Materials:  Pharyngitis (ED)


Referrals: 


Cryer,Jonathan, MD [Medical Doctor] - 


Additional Instructions: 


Call Dr. Cryer's office for an appt. I think he should examine you to make sure 

this is healing properly and to make sure there is nothing else going on with 

your throat.





- Billing Disposition and Condition


Condition: GOOD


Disposition: Home





- Attestation Statements


Document Initiated by Kelsey: Yes


Documenting Scribe: Yony Jackson


Provider For Whom Kelsey is Documenting (Include Credential): Sky Armendariz MD


Scribe Attestation: 


I, Yony Jackson, scribed for Sky Armendariz MD on 07/09/19 at 0148. 


Scribe Documentation Reviewed: Yes


Provider Attestation: 


The documentation as recorded by the Yony randolph accurately reflects 

the service I personally performed and the decisions made by me, Sky Armendariz MD


Status of Scribe Document: Viewed
within normal limits

## 2021-04-15 NOTE — OCCUPATIONAL THERAPY INITIAL EVALUATION ADULT - PERTINENT HX OF CURRENT PROBLEM, REHAB EVAL
Pt is a 64 y/o male admitted for elective surgery for left TKR on 4/14/21 with MD Reyes ,due to OA, pain and DJD.

## 2021-04-15 NOTE — PROGRESS NOTE ADULT - SUBJECTIVE AND OBJECTIVE BOX
Patient is seen and examined at bedside. Denies CP/SOB/Dizziness/N/V/D/HA. Pain is not well controlled.     Vital Signs Last 24 Hrs  T(C): 36.7 (15 Apr 2021 07:13), Max: 37.3 (15 Apr 2021 03:08)  T(F): 98.1 (15 Apr 2021 07:13), Max: 99.2 (15 Apr 2021 03:08)  HR: 51 (15 Apr 2021 07:13) (51 - 103)  BP: 100/69 (15 Apr 2021 07:13) (100/69 - 124/89)  BP(mean): 83 (14 Apr 2021 10:48) (83 - 84)  RR: 17 (15 Apr 2021 07:13) (13 - 22)  SpO2: 95% (15 Apr 2021 07:13) (94% - 98%)      PHYSICAL EXAM:  General: NAD, WDWN.   Neuro:  Alert & responsive  HEENT: NCAT, EOMI, conjunctiva clear  abd: soft, NT/ND   Right LE:  Motor intact + EHL/FHL/TA/GS.  Sensation is grossly intact.  Extremity warm, compartments soft, compressible. No calf tenderness. DP 2+   Left LE: Prineo dressing C/D/I.  Motor intact +EHL/FHL/TA/GS. Sensation is grossly intact. Extremity warm, compartments soft, compressible. No calf tenderness. DP2+    Labs:                          14.5   13.77 )-----------( 240      ( 15 Apr 2021 06:33 )             42.9       04-15    136  |  103  |  20  ----------------------------<  133<H>  4.0   |  27  |  1.54<H>    Ca    8.9      15 Apr 2021 06:33        A/P: Patient is a 65y y/o Male s/p L TKA, POD #1   -wound care, knee extension/leg elevation, cryocuff, isometric exercises, new medications reviewed with pt  -Pain control/analgesia: Unable to give NSAIDS secondary to pt h/o CKD. Dilaudid trial effective. Pt pain went from 9/10 to 5/10.   -Inc spirometry reviewed with pt, demonstrated competence  -DVT prophylaxis with Venodynes/Aspirin 81mg BID  -PT/OT/WBAT LLE  -medical consult reviewed. Bolus for low blood pressure - pt asymptomatic. Encourage increased PO fluid intake as well.   -DC planning: home today  -Pt seen in am with DR Reyes     Patient is seen and examined at bedside. Denies CP/SOB/Dizziness/N/V/D/HA. Pain is not well controlled.     Vital Signs Last 24 Hrs  T(C): 36.7 (15 Apr 2021 07:13), Max: 37.3 (15 Apr 2021 03:08)  T(F): 98.1 (15 Apr 2021 07:13), Max: 99.2 (15 Apr 2021 03:08)  HR: 51 (15 Apr 2021 07:13) (51 - 103)  BP: 100/69 (15 Apr 2021 07:13) (100/69 - 124/89)  BP(mean): 83 (14 Apr 2021 10:48) (83 - 84)  RR: 17 (15 Apr 2021 07:13) (13 - 22)  SpO2: 95% (15 Apr 2021 07:13) (94% - 98%)      PHYSICAL EXAM:  General: NAD, WDWN.   Neuro:  Alert & responsive  HEENT: NCAT, EOMI, conjunctiva clear  abd: soft, NT/ND   Right LE:  Motor intact + EHL/FHL/TA/GS.  Sensation is grossly intact.  Extremity warm, compartments soft, compressible. No calf tenderness. DP 2+   Left LE: Prineo dressing C/D/I.  Motor intact +EHL/FHL/TA/GS. Sensation is grossly intact. Extremity warm, compartments soft, compressible. No calf tenderness. DP2+    Labs:                          14.5   13.77 )-----------( 240      ( 15 Apr 2021 06:33 )             42.9       04-15    136  |  103  |  20  ----------------------------<  133<H>  4.0   |  27  |  1.54<H>    Ca    8.9      15 Apr 2021 06:33        A/P: Patient is a 65y y/o Male s/p L TKA, POD #1   -wound care, knee extension/leg elevation, cryocuff, isometric exercises, new medications reviewed with pt  -Pain control/analgesia: Unable to give NSAIDS secondary to pt h/o CKD. Dilaudid trial effective. Pt pain went from 9/10 to 5/10.   -Inc spirometry reviewed with pt, demonstrated competence  -DVT prophylaxis with Venodynes/Aspirin 81mg BID  -PT/OT/WBAT LLE  -Cardiac monitor/pulse oximetry for HOSA/Major surgery.   -medical consult reviewed. Bolus for low blood pressure - pt asymptomatic. Encourage increased PO fluid intake as well.   -DC planning: home today  -Pt seen in am with DR Reyes

## 2021-04-15 NOTE — DISCHARGE NOTE NURSING/CASE MANAGEMENT/SOCIAL WORK - NSDCFUADDAPPT_GEN_ALL_CORE_FT
Follow up with your surgeon in two weeks. Call for appointment.  If you need more pain medication, call your surgeon's office. For medication refills or authorizations, please call [phone]  We recommend that you call and schedule a follow up appointment within 2-4 weeks with your primary care physician for repeat blood work (CBC and BMP) for post hospital discharge follow-up care.  Call your surgeon if you have increased redness/pain/drainage or fever. Return to ER for shortness of breath/calf tenderness.

## 2021-04-15 NOTE — OCCUPATIONAL THERAPY INITIAL EVALUATION ADULT - GENERAL OBSERVATIONS, REHAB EVAL
Pt was seen for initial OT consult, encountered OOB to chair on cardiac monitoring.Left knee dressing is in place. LLE is edematous. Pt was AA&Ox4, cooperative & followed commands. Pt c/o left knee pain due to s/p revision TKR; this limits pt's activity tolerance ,balance, ADL management and functional mobility Pt was seen for initial OT consult, encountered OOB to chair on cardiac monitoring. Left knee dressing is in place. LLE is edematous. Pt was AA&Ox4, cooperative & followed commands. Pt c/o left knee pain due to s/p revision TKR; this limits pt's activity tolerance ,balance, ADL management and functional mobility

## 2021-04-15 NOTE — OCCUPATIONAL THERAPY INITIAL EVALUATION ADULT - ADDITIONAL COMMENTS
Prior to admission, pt was functioning in his roles, self sufficient & ambulating independently without any assistive devices. Presently pt needs assistance with lower body self care tasks due to pain, weakness, stiffness and decreased ROM from left TKR. Pt is right hand dominant and wears glasses for reading. Prior to admission, pt was functioning in his roles, self sufficient & ambulating independently without any assistive devices. Presently, pt needs assistance with lower body self care tasks due to pain, weakness, stiffness and decreased ROM from left TKR. Pt is right hand dominant and wears glasses for reading.

## 2021-04-15 NOTE — OCCUPATIONAL THERAPY INITIAL EVALUATION ADULT - LIVES WITH, PROFILE
in a private house with 9 entry steps equipped with right ascending handrail. Once inside, pt has to 3 steps of with right ascending right ascending handrail,  to access the bedroom and bathroom. The bathroom has a tub/shower combination , fixed / retractable shower head and standard toilet with adequate space to fit a commode over it. ./spouse

## 2021-04-15 NOTE — PROGRESS NOTE ADULT - SUBJECTIVE AND OBJECTIVE BOX
ERIK CONTRERAS is a 65y Male s/p ROBOTIC ASSISTED LEFT TOTAL KNEE ARTHROPLASTY WITH JHONATHAN        denies any chest pain shortness of breath palpitation dizziness lightheadedness nausea vomiting fever or chills    T(C): 36.7 (04-15-21 @ 07:13), Max: 37.3 (04-15-21 @ 03:08)  HR: 51 (04-15-21 @ 07:13) (51 - 103)  BP: 100/69 (04-15-21 @ 07:13) (100/69 - 124/89)  RR: 17 (04-15-21 @ 07:13) (13 - 17)  SpO2: 95% (04-15-21 @ 07:13) (94% - 97%)  no jvd/bruit  s1 s2 rrr  cta  s/nt/nd  no calf tend                        14.5   13.77 )-----------( 240      ( 15 Apr 2021 06:33 )             42.9   04-15    136  |  103  |  20  ----------------------------<  133<H>  4.0   |  27  |  1.54<H>    Ca    8.9      15 Apr 2021 06:33        cont dvt px  pain control  bowel regimen  antiemetics  incentive spirometer

## 2021-04-15 NOTE — OCCUPATIONAL THERAPY INITIAL EVALUATION ADULT - PRECAUTIONS/LIMITATIONS, REHAB EVAL
Monitor vital signs with activities/fall precautions/obesity precautions Monitor vital signs with activities. Pt has a history of multiple comorbidities./fall precautions/obesity precautions

## 2021-04-16 LAB — SURGICAL PATHOLOGY STUDY: SIGNIFICANT CHANGE UP

## 2021-04-17 LAB
COVID-19 SPIKE DOMAIN AB INTERP: POSITIVE
COVID-19 SPIKE DOMAIN ANTIBODY RESULT: >250 U/ML — HIGH
SARS-COV-2 IGG+IGM SERPL QL IA: >250 U/ML — HIGH
SARS-COV-2 IGG+IGM SERPL QL IA: POSITIVE

## 2021-04-20 DIAGNOSIS — M54.16 RADICULOPATHY, LUMBAR REGION: ICD-10-CM

## 2021-04-20 DIAGNOSIS — E78.5 HYPERLIPIDEMIA, UNSPECIFIED: ICD-10-CM

## 2021-04-20 DIAGNOSIS — E03.9 HYPOTHYROIDISM, UNSPECIFIED: ICD-10-CM

## 2021-04-20 DIAGNOSIS — Z85.820 PERSONAL HISTORY OF MALIGNANT MELANOMA OF SKIN: ICD-10-CM

## 2021-04-20 DIAGNOSIS — I10 ESSENTIAL (PRIMARY) HYPERTENSION: ICD-10-CM

## 2021-04-20 DIAGNOSIS — G47.33 OBSTRUCTIVE SLEEP APNEA (ADULT) (PEDIATRIC): ICD-10-CM

## 2021-04-20 DIAGNOSIS — M17.12 UNILATERAL PRIMARY OSTEOARTHRITIS, LEFT KNEE: ICD-10-CM

## 2021-04-20 DIAGNOSIS — Z85.528 PERSONAL HISTORY OF OTHER MALIGNANT NEOPLASM OF KIDNEY: ICD-10-CM

## 2022-01-01 NOTE — PATIENT PROFILE ADULT. - ABILITY TO HEAR (WITH HEARING AID OR HEARING APPLIANCE IF NORMALLY USED):
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 and popped blood vessels under her left arm noted swelling bruising and thumb and fifth finger left hand tingling . History Provided By: Patient    HPI: Lester Abreu, 31 y.o. female with no significant past medical history, presents to the ED with cc of upper arm injury. The patient reports that she was lifting boxes to put up onto a high shelf when she struck her upper arm against a shelf. Since then, she has developed gradually worsening bruising and pain to the upper arm. She has associated paresthesias to the first and fifth fingers. Pain is worse with palpation and movement. She denies arm weakness, other injuries. There are no other complaints, changes, or physical findings at this time. PCP: Unknown, Provider, DPM    No current facility-administered medications on file prior to encounter. Current Outpatient Medications on File Prior to Encounter   Medication Sig Dispense Refill    citalopram (CELEXA) 20 mg tablet Take 1 Tablet by mouth daily. 30 Tablet 1    naproxen (NAPROSYN) 500 mg tablet          Past History     Past Medical History:  No past medical history on file. Past Surgical History:  No past surgical history on file.     Family History:  Family History   Problem Relation Age of Onset    Diabetes Mother     Cataract Mother     No Known Problems Father        Social History:  Social History     Tobacco Use    Smoking status: Current Some Day Smoker     Packs/day: 0.33     Years: 5.00     Pack years: 1.65     Types: Cigarettes    Smokeless tobacco: Never Used   Vaping Use    Vaping Use: Never used   Substance Use Topics    Alcohol use: Yes     Comment: occasionally    Drug use: Not Currently     Frequency: 10.0 times per week       Allergies: Allergies   Allergen Reactions    Latex Rash         Review of Systems   Review of Systems   Constitutional: Negative for chills and fever. HENT: Negative for ear pain and sore throat. Eyes: Negative for redness and visual disturbance. Respiratory: Negative for cough and shortness of breath. Cardiovascular: Negative for chest pain and palpitations. Gastrointestinal: Negative for abdominal pain, nausea and vomiting. Genitourinary: Negative for dysuria and hematuria. Musculoskeletal: Negative for back pain and gait problem. +arm pain   Skin: Negative for rash and wound. Neurological: Negative for dizziness and headaches. +paresthesias   Psychiatric/Behavioral: Negative for behavioral problems and confusion. All other systems reviewed and are negative. Physical Exam   Physical Exam  Constitutional:       Appearance: She is not toxic-appearing. HENT:      Head: Normocephalic and atraumatic. Mouth/Throat:      Mouth: Mucous membranes are moist.   Eyes:      Extraocular Movements: Extraocular movements intact. Pupils: Pupils are equal, round, and reactive to light. Cardiovascular:      Rate and Rhythm: Normal rate and regular rhythm. Pulses:           Radial pulses are 2+ on the right side. Pulmonary:      Effort: Pulmonary effort is normal. No respiratory distress. Musculoskeletal:         General: No deformity. Normal range of motion. Cervical back: Normal range of motion and neck supple. Comments: Contusion to the medial distal upper arm with tenderness to palpation. No bony tenderness. Full range of motion in arm. Skin:     General: Skin is warm and dry. Neurological:      General: No focal deficit present. Mental Status: She is alert and oriented to person, place, and time. Sensory: No sensory deficit. Motor: No weakness.    Psychiatric:         Behavior: Behavior normal.           Diagnostic Study Results     Labs -   No results found for this or any previous visit (from the past 12 hour(s)). Radiologic Studies -   No orders to display     CT Results  (Last 48 hours)    None        CXR Results  (Last 48 hours)    None            Medical Decision Making   I am the first provider for this patient. I reviewed the vital signs, available nursing notes, past medical history, past surgical history, family history and social history. Vital Signs-Reviewed the patient's vital signs. Patient Vitals for the past 12 hrs:   Temp Pulse Resp BP SpO2   12/31/21 1442 98.6 °F (37 °C) 76 14 133/81 99 %         Records Reviewed: Nursing Notes and Old Medical Records      Provider Notes (Medical Decision Making):   DDx: contusion, biceps tendonipathy, radiculopathy        ED Course:   Initial assessment performed. The patients presenting problems have been discussed, and they are in agreement with the care plan formulated and outlined with them. I have encouraged them to ask questions as they arise throughout their visit. Disposition:  4:53 PM  The patient has been re-evaluated and is ready for discharge. Reviewed available results with patient. Counseled patient on diagnosis and care plan. Patient has expressed understanding, and all questions have been answered. Patient agrees with plan and agrees to follow up as recommended, or to return to the ED if their symptoms worsen. Discharge instructions have been provided and explained to the patient, along with reasons to return to the ED. PLAN:  1. Discharge Medication List as of 12/31/2021  4:53 PM      START taking these medications    Details   !! naproxen (Naprosyn) 500 mg tablet Take 1 Tablet by mouth two (2) times daily as needed for Pain for up to 10 days. , Normal, Disp-20 Tablet, R-0      methocarbamoL (ROBAXIN) 750 mg tablet Take 1 Tablet by mouth four (4) times daily as needed for Muscle Spasm(s). , Normal, Disp-20 Tablet, R-0       !! - Potential duplicate medications found. Please discuss with provider. CONTINUE these medications which have NOT CHANGED    Details   citalopram (CELEXA) 20 mg tablet Take 1 Tablet by mouth daily. , Normal, Disp-30 Tablet, R-1      !! naproxen (NAPROSYN) 500 mg tablet Historical Med       !! - Potential duplicate medications found. Please discuss with provider. 2.   Follow-up Information     Follow up With Specialties Details Why Contact Monique Panchal, DO Orthopedic Surgery Call  to schedule a follow up [address] [place] [address], [phone]      [facility] EMERGENCY DEPT Emergency Medicine Go to  If symptoms worsen [address] [place] [address]  [phone]        Return to ED if worse     Diagnosis     Clinical Impression:   1. Contusion of left upper extremity, initial encounter            Leticia Brown.  MESSI Newberry Adequate: hears normal conversation without difficulty

## 2022-05-26 NOTE — ASU PREOP CHECKLIST - TEMPERATURE IN FAHRENHEIT (DEGREES F)
97.9 98.1 Cimetidine Pregnancy And Lactation Text: This medication is Pregnancy Category B and is considered safe during pregnancy. It is also excreted in breast milk and breast feeding isn't recommended.
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
## 2022-09-27 ENCOUNTER — APPOINTMENT (OUTPATIENT)
Dept: ORTHOPEDIC SURGERY | Facility: CLINIC | Age: 67
End: 2022-09-27

## 2022-09-27 PROCEDURE — 99215 OFFICE O/P EST HI 40 MIN: CPT

## 2022-09-27 PROCEDURE — 73562 X-RAY EXAM OF KNEE 3: CPT | Mod: RT

## 2022-09-27 NOTE — DISCUSSION/SUMMARY
[de-identified] : The natural progression of Osteoarthritis was explained to the patient.  We discussed the possible treatment options from conservative to operative.  These included NSAIDS, Glucosamine and Chondrotin sulfate, and Physical Therapy as well different types of injections.  We also discussed that at some point they may progress to needed a TKA.  Information and pamphlets were given when appropriate.\par \par Patient Complains of pain in Knee with a level that often reaches greater than a 8/10. The Pain has been\par progressively worsening of his/her treatment coarse. The pain has interfered with their ADLs and worsens with\par weight bearing. On exam they often have episodes of swelling/effusion with limited ROM. Pain worsens with ROM\par passive and active and I can palpate crepitus.\par  X rays were reviewed with the patient and they show joint space narrowing, subchondral sclerosis,\par osteophyte formation, and subchondral cysts.\par  After a period of more than 12 weeks physical therapy or exercise program done with me or another\par treating physician they have continued pain. The patient has failed a trial of NSAID medication or pain relieves if\par they were unable to tolerate NSAID medications as well as a series of injection, steroid or Hyaluronic Acid. After a\par long discussion with the patient both the patient and I have decided we have exhausted all forms of less radical\par treatments and they would like to proceed with Total Knee Replacement\par \par We discussed my findings and the natural history of their condition.  We talked about the details of the proposed surgery and the recovery.  We discussed the material risks, possible benefits and alternatives to surgery.  The risks include but are not limited to infection, bleeding and possible need for blood transfusion, fracture, bowel blockage, bladder retention or infection, need for reoperation, stiffness and/or limited range of motion, possible damage to nerves and blood vessels, failure of fixation of components, risk of deep vein thromboses and pulmonary embolism, wound healing problems, dislocation, and possible leg length discrepancy.  Although incredibly rare, we also discussed the risks of a cardiac event, stroke and even death during, or following, the surgery.  We discussed the type of implants the patient will be receiving and the type of fixation that will be used, as well as whether a robot or computer navigation aide will be used.  The patient understands they will need medical clearance and will attend a preoperative joint education class.  We also discussed the type of anesthesia they will receive, and the risks associated with hospital or rehab length of stay, obesity, diabetes and smoking.\par

## 2022-09-27 NOTE — ASSESSMENT
[FreeTextEntry1] : dv medial OA both knees. Discussed options - mult HA and cortisone injections in the past with mixed results. He would like to plan for left TKA. H/O GI bleed from NSAIDs. 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 Santana.\par 4/27/21: Nearly 2 weeks post op, in pain but doing well overall. ROM 3-90 today. Will begin out patient PT and continue HEP.\par 5/25/21: 6 weeks postop doing very well, cont PT.\par 6/22/21: 3 months postop min pain, cont HEP. Abx for dentist.\par \par 9/27/22: Advanced OA in right knee - He has failed conservative treatment and he has been delaying surgery. Would like to proceed with R TKA. L TKA doing well  - Risks and benefits discussed

## 2022-09-27 NOTE — HISTORY OF PRESENT ILLNESS
[Dull/Aching] : dull/aching [Constant] : constant [Rest] : rest [Standing] : standing [Walking] : walking [Stairs] : stairs [de-identified] : 9/27/22: 1.5 postop L TKA doing well with minimal pain and some tighness but overall back to normal acitivtes - Recently started to have right knee pain that has increased  \par \par Previous Doc: \par B/L knee pain for several years, worsening. Left worse than right. Mult HA injections with some relief but becoming less effective. Recent cortisone inj 2 weeks ago helped. Saw another doctor at [facility] who recc TKA. H/O GI bleed from NSAIDs x 2. 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 \par 4/14/21: left TKA\par 4/27/21: 13 days s/p left TKA- doing okay in some pain but mostly tolerable. Taking pain meds at night. No fevers or chills. Finishing up at home PT. Ambulating with cane.\par 5/25/21: 6 weeks postop improving with PT. Some lateral pain otherwise very happy.\par 6/22/21: 10 weeks postop min pain. PT helps. [] : no [FreeTextEntry1] : right knee  [FreeTextEntry5] : onset of pain for many years. had CSI's, gel injections, PT. pt says he is here to discuss surgery. \par -hx of R TKA 04/2021

## 2022-09-30 NOTE — H&P PST ADULT - LAST ECHOCARDIOGRAM
"Time: 4:43 PM EDT  Chief Complaint: Fever, tachycardia  Chief Complaint   Patient presents with   • Shortness of Breath   • Fever           History of Present Illness:    History obtained by triage:  Patient is a 25 y.o. year old male who presents to the emergency department after being sent from an outlying facility.  Patient reports that he went \"somewhere\" to get a physical and was told he needed to come to the emergency room.  The patient is a very poor historian.  He is unable to tell me where he was seen in who he was seen by.  Additionally, he is unsure why he is even here.  He does report to me a history of a TBI with memory loss in 2008 as well as a history of cystic fibrosis.  He reports he does not take any daily medications and has not followed by any pulmonology for this.  He reports he is currently living with his sister.  When I asked him what prompted him to come to the emergency department he states he was told to and that he feels fine.  His heart rate is notably elevated at 149 upon arrival as well as febrile of 100.5.  I did ask him if he had any additional symptoms and he did say he did have a runny nose and a cough but states that his cough is present all the time.  Denies any abdominal pain, nausea, vomiting or diarrhea.  Denies any dysuria but he states that his urine output has been decreased.    History Obtained by ED Provider  Patient reports a history of cystic fibrosis. He elaborates he has a chronic cough, but that his cough has increased in the past two days. He denies noticing subjective fever in this time. He reports associated chest pain and difficulty breathing, but denies abdominal pain, vomiting, or diarrhea. The chest pain is left-sided, intermittent, and burning in quality. It worsens and radiates to his back when he coughs. He denies any sick contacts.              Patient Care Team  Primary Care Provider: Provider, No Known    Past Medical History:     No Known " Allergies  Past Medical History:   Diagnosis Date   • Cystic fibrosis (HCC)    • Head injury      History reviewed. No pertinent surgical history.  Family History   Problem Relation Age of Onset   • Asthma Sister        Home Medications:  Prior to Admission medications    Medication Sig Start Date End Date Taking? Authorizing Provider   albuterol (PROVENTIL) (2.5 MG/3ML) 0.083% nebulizer solution Take 2.5 mg by nebulization Every 4 (Four) Hours As Needed for Wheezing. 9/30/22   Bernie Jackson APRN   budesonide (PULMICORT) 0.5 MG/2ML nebulizer solution Take 2 mL by nebulization 2 (Two) Times a Day. 9/30/22   Bernie Jackson APRN   formoterol (Perforomist) 20 MCG/2ML nebulizer solution Take 2 mL by nebulization 2 (Two) Times a Day. 9/30/22   Bernie Jacskon APRN   GaviLyte-G 236 g solution  3/16/22   Provider, MD Ewa   ketorolac (TORADOL) 10 MG tablet Take 1 tablet by mouth Every 6 (Six) Hours As Needed for Moderate Pain . 3/16/22   Ad Segura APRN   ondansetron ODT (ZOFRAN-ODT) 4 MG disintegrating tablet Place 1 tablet on the tongue Every 8 (Eight) Hours As Needed for Nausea or Vomiting. 3/16/22   Ad Segura APRN   pancrelipase, Lip-Prot-Amyl, (Creon) 6000-74930 units capsule delayed-release particles capsule Take 1 capsule by mouth 3 (Three) Times a Day With Meals. And two capsules with each snack 5/20/22   Bernie Jackson APRN   revefenacin (YUPELRI) 175 MCG/3ML nebulizer solution Take 3 mL by nebulization Daily. 9/30/22   Bernie Jackson APRN   albuterol (PROVENTIL) (2.5 MG/3ML) 0.083% nebulizer solution Take 2.5 mg by nebulization Every 4 (Four) Hours As Needed for Wheezing. 5/20/22 9/30/22  Bernie Jackson APRN   budesonide (PULMICORT) 0.5 MG/2ML nebulizer solution USE 1 VIAL  IN  NEBULIZER TWICE  DAILY - rinse mouth after treatment 9/9/22 9/30/22  Bernie Jackson APRN   Perforomist 20 MCG/2ML nebulizer solution USE 1 VIAL  IN  NEBULIZER TWICE  DAILY - morning and evening 9/9/22 9/30/22  Bernie Jackson,  "APRN   revefenacin (YUPELRI) 175 MCG/3ML nebulizer solution Take 3 mL by nebulization Daily. 5/20/22 9/30/22  Bernie Jackson APRN        Social History:   Social History     Tobacco Use   • Smoking status: Never Smoker   • Smokeless tobacco: Current User     Types: Chew   Vaping Use   • Vaping Use: Never used   Substance Use Topics   • Alcohol use: Not Currently   • Drug use: Defer         Review of Systems:  Review of Systems   Constitutional: Positive for fever. Negative for chills.   HENT: Positive for congestion and rhinorrhea. Negative for sore throat.    Eyes: Negative for pain and visual disturbance.   Respiratory: Positive for cough. Negative for apnea, chest tightness and shortness of breath.    Cardiovascular: Negative for chest pain and palpitations.   Gastrointestinal: Negative for abdominal pain, diarrhea, nausea and vomiting.   Genitourinary: Negative for difficulty urinating and dysuria.   Musculoskeletal: Negative for joint swelling and myalgias.   Skin: Negative for color change.   Neurological: Negative for seizures and headaches.   Psychiatric/Behavioral: Negative.    All other systems reviewed and are negative.       Physical Exam:  BP 95/74 (BP Location: Right arm, Patient Position: Sitting)   Pulse 104   Temp 98.2 °F (36.8 °C) (Oral)   Resp 14   Ht 154.9 cm (61\")   Wt 48 kg (105 lb 13.1 oz)   SpO2 96%   BMI 19.99 kg/m²     Physical Exam  Vitals and nursing note reviewed.   Constitutional:       General: He is not in acute distress.     Appearance: Normal appearance. He is not toxic-appearing.   HENT:      Head: Normocephalic and atraumatic.      Jaw: There is normal jaw occlusion.   Eyes:      General: Lids are normal.      Extraocular Movements: Extraocular movements intact.      Conjunctiva/sclera: Conjunctivae normal.      Pupils: Pupils are equal, round, and reactive to light.   Cardiovascular:      Rate and Rhythm: Normal rate and regular rhythm.      Pulses: Normal pulses.      " Heart sounds: Normal heart sounds.   Pulmonary:      Effort: Pulmonary effort is normal. No respiratory distress.      Breath sounds: Normal breath sounds. No wheezing or rhonchi.   Abdominal:      General: Abdomen is flat. There is no distension.      Palpations: Abdomen is soft.      Tenderness: There is no abdominal tenderness. There is no guarding or rebound.   Musculoskeletal:         General: Normal range of motion.      Cervical back: Normal range of motion and neck supple.      Right lower leg: No edema.      Left lower leg: No edema.   Skin:     General: Skin is warm and dry.      Coloration: Skin is not cyanotic.   Neurological:      Mental Status: He is alert and oriented to person, place, and time. Mental status is at baseline.      GCS: GCS eye subscore is 4. GCS verbal subscore is 5. GCS motor subscore is 6.   Psychiatric:         Attention and Perception: Attention and perception normal.         Mood and Affect: Mood normal.         Cognition and Memory: Cognition is impaired. Memory is impaired.                Medications in the Emergency Department:  Medications   sodium chloride 0.9 % flush 10 mL (has no administration in time range)   Pharmacy to Dose Cefepime (has no administration in time range)   Pharmacy to dose vancomycin (has no administration in time range)   Pharmacy to Dose tobramycin (NEBCIN) (has no administration in time range)   albuterol (PROVENTIL) nebulizer solution 0.083% 2.5 mg/3mL (has no administration in time range)   budesonide (PULMICORT) nebulizer solution 0.5 mg (0.5 mg Nebulization Given 10/1/22 0045)   arformoterol (BROVANA) nebulizer solution 15 mcg (15 mcg Nebulization Given 10/1/22 0045)   revefenacin (YUPELRI) nebulizer solution 175 mcg (has no administration in time range)   pancrelipase (Lip-Prot-Amyl) (CREON) capsule 6,000 units of lipase (has no administration in time range)   ondansetron ODT (ZOFRAN-ODT) disintegrating tablet 4 mg (has no administration in time  range)   nitroglycerin (NITROSTAT) SL tablet 0.4 mg (has no administration in time range)   sodium chloride 0.9 % flush 10 mL (has no administration in time range)   sodium chloride 0.9 % flush 10 mL (10 mL Intravenous Given 10/1/22 0118)   sodium chloride 0.9 % infusion 40 mL (has no administration in time range)   heparin (porcine) 5000 UNIT/ML injection 5,000 Units (5,000 Units Subcutaneous Not Given 10/1/22 0203)   lactated ringers infusion (100 mL/hr Intravenous New Bag 10/1/22 0008)   acetaminophen (TYLENOL) tablet 650 mg (has no administration in time range)     Or   acetaminophen (TYLENOL) 160 MG/5ML solution 650 mg (has no administration in time range)     Or   acetaminophen (TYLENOL) suppository 650 mg (has no administration in time range)   cefepime (MAXIPIME) 2 g/100 mL 0.9% NS (mbp) (has no administration in time range)   vancomycin 750 mg/250 mL 0.9% NS IVPB ([facility]) (has no administration in time range)   ibuprofen (ADVIL,MOTRIN) tablet 600 mg (600 mg Oral Given 9/30/22 1806)   sodium chloride 0.9 % bolus 1,440 mL (0 mL/kg × 48 kg Intravenous Stopped 9/30/22 1948)   cefTRIAXone (ROCEPHIN) IVPB 2 g (0 g Intravenous Stopped 9/30/22 1948)   cefepime (MAXIPIME) 2 g/100 mL 0.9% NS (mbp) (0 g Intravenous Stopped 9/30/22 2059)   vancomycin 1000 mg/250 mL 0.9% NS IVPB ([facility]) (1,000 mg Intravenous New Bag 9/30/22 2243)   tobramycin (NEBCIN) 340 mg in sodium chloride 0.9 % 100 mL IVPB (0 mg/kg × 48 kg Intravenous Stopped 9/30/22 2154)   benzonatate (TESSALON) capsule 200 mg (200 mg Oral Given 9/30/22 2245)        Labs  Lab Results (last 24 hours)     Procedure Component Value Units Date/Time    CBC & Differential [[id number]]  (Abnormal) Collected: 09/30/22 1704    Specimen: Blood Updated: 09/30/22 1713    Narrative:      The following orders were created for panel order CBC & Differential.  Procedure                               Abnormality         Status                     ---------                                -----------         ------                     CBC Auto Differential[[id number]]        Abnormal            Final result                 Please view results for these tests on the individual orders.    Comprehensive Metabolic Panel [[id number]]  (Abnormal) Collected: 09/30/22 1704    Specimen: Blood Updated: 09/30/22 1734     Glucose 190 mg/dL      BUN 9 mg/dL      Creatinine 0.93 mg/dL      Sodium 132 mmol/L      Potassium 3.5 mmol/L      Chloride 93 mmol/L      CO2 26.0 mmol/L      Calcium 8.9 mg/dL      Total Protein 8.0 g/dL      Albumin 3.50 g/dL      ALT (SGPT) 32 U/L      AST (SGOT) 18 U/L      Alkaline Phosphatase 180 U/L      Total Bilirubin 0.6 mg/dL      Globulin 4.5 gm/dL      A/G Ratio 0.8 g/dL      BUN/Creatinine Ratio 9.7     Anion Gap 13.0 mmol/L      eGFR 116.1 mL/min/1.73      Comment: National Kidney Foundation and American Society of Nephrology (ASN) Task Force recommended calculation based on the Chronic Kidney Disease Epidemiology Collaboration (CKD-EPI) equation refit without adjustment for race.       Narrative:      GFR Normal >60  Chronic Kidney Disease <60  Kidney Failure <15      Lactic Acid, Plasma [[id number]]  (Normal) Collected: 09/30/22 1704    Specimen: Blood Updated: 09/30/22 1733     Lactate 1.3 mmol/L     Blood Culture - Blood, Arm, Left [[id number]] Collected: 09/30/22 1704    Specimen: Blood from Arm, Left Updated: 09/30/22 1711    Blood Culture - Blood, Arm, Right [[id number]] Collected: 09/30/22 1704    Specimen: Blood from Arm, Right Updated: 09/30/22 1711    CBC Auto Differential [[id number]]  (Abnormal) Collected: 09/30/22 1704    Specimen: Blood Updated: 09/30/22 1713     WBC 17.00 10*3/mm3      RBC 4.92 10*6/mm3      Hemoglobin 13.7 g/dL      Hematocrit 40.1 %      MCV 81.5 fL      MCH 27.8 pg      MCHC 34.2 g/dL      RDW 13.2 %      RDW-SD 39.1 fl      MPV 9.6 fL      Platelets 474 10*3/mm3      Neutrophil % 72.3 %      Lymphocyte % 17.1 %      Monocyte % 9.3 %       Eosinophil % 0.2 %      Basophil % 0.6 %      Immature Grans % 0.5 %      Neutrophils, Absolute 12.30 10*3/mm3      Lymphocytes, Absolute 2.91 10*3/mm3      Monocytes, Absolute 1.58 10*3/mm3      Eosinophils, Absolute 0.03 10*3/mm3      Basophils, Absolute 0.10 10*3/mm3      Immature Grans, Absolute 0.08 10*3/mm3      nRBC 0.0 /100 WBC     Rapid Strep A Screen - Swab, Throat [[id number]]  (Normal) Collected: 09/30/22 1746    Specimen: Swab from Throat Updated: 09/30/22 1806     Strep A Ag Negative    Influenza Antigen, Rapid - Swab, Nasopharynx [[id number]]  (Normal) Collected: 09/30/22 1746    Specimen: Swab from Nasopharynx Updated: 09/30/22 1813     Influenza A Ag, EIA Negative     Influenza B Ag, EIA Negative    COVID-19,APTIMA PANTHER(KOURTNEY),[facility]/ [facility], NP/OP SWAB IN UTM/VTM/SALINE TRANSPORT MEDIA,24 HR TAT - Swab, Nasopharynx [[id number]]  (Normal) Collected: 09/30/22 1746    Specimen: Swab from Nasopharynx Updated: 10/01/22 0052     COVID19 Not Detected    Narrative:      Fact sheet for providers: https://www.fda.gov/media/203290/download     Fact sheet for patients: https://www.fda.gov/media/621721/download    Test performed by RT PCR.    Beta Strep Culture, Throat - Swab, Throat [[id number]] Collected: 09/30/22 1746    Specimen: Swab from Throat Updated: 09/30/22 1806    Urinalysis With Culture If Indicated - Urine, Clean Catch [[id number]]  (Normal) Collected: 09/30/22 2031    Specimen: Urine, Clean Catch Updated: 09/30/22 2050     Color, UA Yellow     Appearance, UA Clear     pH, UA 6.0     Specific Gravity, UA 1.007     Glucose, UA Negative     Ketones, UA Negative     Bilirubin, UA Negative     Blood, UA Negative     Protein, UA Negative     Leuk Esterase, UA Negative     Nitrite, UA Negative     Urobilinogen, UA 1.0 E.U./dL    Narrative:      In absence of clinical symptoms, the presence of pyuria, bacteria, and/or nitrites on the urinalysis result does not correlate with infection.  Urine microscopic not  indicated.    Respiratory Culture - Sputum, Cough [[id number]] Collected: 10/01/22 0108    Specimen: Sputum from Cough Updated: 10/01/22 0223     Gram Stain Moderate (3+) WBCs seen      Rare (1+) Gram positive bacilli      Rare (1+) Gram positive cocci in pairs and clusters      Occasional Gram negative bacilli    CBC Auto Differential [[id number]] Collected: 10/01/22 0230    Specimen: Blood Updated: 10/01/22 0246    Comprehensive Metabolic Panel [[id number]] Collected: 10/01/22 0230    Specimen: Blood Updated: 10/01/22 0246    Tobramycin Level, Peak This is a 4 hour fredis peak, we are getting a peak and random on this patient, fyi [[id number]] Collected: 10/01/22 0230    Specimen: Blood Updated: 10/01/22 0246           Imaging:  XR Chest 2 View    Result Date: 9/30/2022  PROCEDURE: XR CHEST 2 VW  COMPARISON: Psychiatric, CR, CHEST PA/AP & LAT 2V, 1/09/2019, 19:33.  Psychiatric, CR, CHEST AP/PA 1 VIEW, 1/22/2020, 6:33.  INDICATIONS: fever, elevated heart rate, cough  FINDINGS:  Stable heart and mediastinal contour.  No pleural effusion.  The there is focal opacity in the right lower lobe best seen the lateral posterior the heart compatible with pneumonia.  Mild diffuse opacities also noted including the upper lobes.        1. There is mild diffuse opacities , somewhat nodular in the upper lobes, with more focal airspace opacity in the right lower lobe most concerning for multifocal pneumonia.  Recommend follow-up to resolution.     NABEEL ERIC MD       Electronically Signed and Approved By: NABEEL ERIC MD on 9/30/2022 at 18:45               Procedures:  Procedures    Progress  ED Course as of 10/01/22 0253   Fri Sep 30, 2022   1641 --- PROVIDER IN TRIAGE NOTE ---    The patient was evaluated in triage by me, Annalisa Molina. Orders were placed and the patient is currently awaiting disposition. [AR]   4460 EKG interpretation: Normal sinus tachycardia, heart rate 130, normal NV and QT  intervals, normal QRS duration, normal axis, nonspecific ST segment changes no acute ischemia. [RP]   1928 Case discussed with Dr. Scott for admission. [RP]      ED Course User Index  [AR] Annalisa Molina, APRN  [RP] Nash Kay MD                            The patient was initially evaluated in the triage area where orders were placed. The patient was later dispositioned by Nash Kay MD.      The patient was advised to stay for completion of workup which includes but is not limited to communication of labs and radiological results, reassessment and plan. The patient was advised that leaving prior to disposition by a provider could result in critical findings that are not communicated to the patient.     Medical Decision Making:  MDM  Number of Diagnoses or Management Options  Risk of Complications, Morbidity, and/or Mortality  Presenting problems: moderate  Management options: low    Patient Progress  Patient progress: stable (19:22 EDT: Patient rechecked. Updated patient and his daughter on pertinent results and plan for admission. They expressed understanding and agreement. All questions answered at this time.  )           The following orders were placed after triage and evaluation:  Orders Placed This Encounter   Procedures   • Blood Culture - Blood,   • Blood Culture - Blood,   • Rapid Strep A Screen - Swab, Throat   • Influenza Antigen, Rapid - Swab, Nasopharynx   • COVID-19,APTIMA PANTHER(KOURTNEY),[facility], NP/OP SWAB IN UTM/VTM/SALINE TRANSPORT MEDIA,24 HR TAT - Swab, Nasopharynx   • Beta Strep Culture, Throat - Swab, Throat   • Respiratory Culture - Sputum, Cough   • MRSA Screen, PCR (Inpatient) - Swab, Nares   • XR Chest 2 View   • Comprehensive Metabolic Panel   • Lactic Acid, Plasma   • [place] Draw   • CBC Auto Differential   • Urinalysis With Culture If Indicated - Urine, Clean Catch   • Potassium   • Magnesium   • Troponin   • Blood Gas, Arterial -With Co-Ox Panel: Yes   • CBC  Auto Differential   • Comprehensive Metabolic Panel   • Tobramycin Level, Random   • Tobramycin Level, Peak This is a 4 hour fredis peak, we are getting a peak and random on this patient, fyi   • Diet Regular   • Undress & Gown   • Continuous Pulse Oximetry   • Vital Signs   • Vital Signs   • Continuous Cardiac Monitoring   • Telemetry - Pulse Oximetry   • ACLS Protocol For Life Threatening Dysrhythmias (Unless Code Status Indicates Otherwise)   • Notify Provider if ACLS Protocol Activated   • Intake & Output   • Weigh Patient   • Oral Care   • Saline Lock & Maintain IV Access   • Notify Provider (With Default Parameters)   • Code Status and Medical Interventions:   • Hospitalist (on-call MD unless specified)   • Inpatient Pulmonology Consult   • Oxygen Therapy- Nasal Cannula; Titrate for SPO2: 90% - 95%   • ECG 12 Lead   • ECG 12 Lead   • Insert Peripheral IV   • Insert Peripheral IV   • Inpatient Admission   • CBC & Differential   • Green Top (Gel)   • Lavender Top   • Gold Top - SST   • Light Blue Top   • Extra Tubes   • Green Top (Gel)       Final diagnoses:   Sepsis, due to unspecified organism, unspecified whether acute organ dysfunction present (HCC)   Multifocal pneumonia          Disposition:  ED Disposition     ED Disposition   Decision to Admit    Condition   --    Comment   Level of Care: Remote Telemetry [26]   Diagnosis: Sepsis, due to unspecified organism, unspecified whether acute organ dysfunction present (HCC) [[id number]]   Certification: I Certify That Inpatient Hospital Services Are Medically Necessary For Greater Than 2 Midnights               This medical record created using voice recognition software.           Jasmina Gamble  09/30/22 1710       Jasmina Gamble  09/30/22 1710    Nash FITZGERALD MD, am scribing for and in the presence of Gold Garcia Jr., MD. 10/1/2022 02:53 EDT    Radha FITZGERALD Joseph R Jr., MD personally performed the services described in this documentation, as scribed by  Nash Kay MD in my presence, and it is both accurate and complete.            Jasmina Gamble  09/30/22 1712       Jasmina Gamble  09/30/22 1714       Jasmina Gamble  09/30/22 1715       Jasmina Gamble  09/30/22 1922       Nash Kay MD  10/01/22 0253     2016

## 2022-10-04 ENCOUNTER — FORM ENCOUNTER (OUTPATIENT)
Age: 67
End: 2022-10-04

## 2022-10-05 ENCOUNTER — APPOINTMENT (OUTPATIENT)
Dept: CT IMAGING | Facility: CLINIC | Age: 67
End: 2022-10-05

## 2022-10-05 PROCEDURE — 73700 CT LOWER EXTREMITY W/O DYE: CPT | Mod: RT

## 2022-10-05 PROCEDURE — 76376 3D RENDER W/INTRP POSTPROCES: CPT | Mod: RT

## 2022-10-17 ENCOUNTER — APPOINTMENT (OUTPATIENT)
Dept: PAIN MANAGEMENT | Facility: CLINIC | Age: 67
End: 2022-10-17

## 2022-10-17 VITALS — BODY MASS INDEX: 39.9 KG/M2 | HEIGHT: 71 IN | WEIGHT: 285 LBS

## 2022-10-17 DIAGNOSIS — M79.10 MYALGIA, UNSPECIFIED SITE: ICD-10-CM

## 2022-10-17 PROCEDURE — 99214 OFFICE O/P EST MOD 30 MIN: CPT

## 2022-10-17 NOTE — DISCUSSION/SUMMARY
[de-identified] : pain in neck which will radiate into R arm , rarely on L side, however constantly in neck and across shoulder blades\par has had prior BEN 1/21 with 80% relief of pain improved ROM and ADLS\par \par will obtain updated MRI C spine and plan C7-T1 BEN once reviewed

## 2022-10-17 NOTE — HISTORY OF PRESENT ILLNESS
[7] : 7 [3] : 3 [Dull/Aching] : dull/aching [Intermittent] : intermittent [Meds] : meds [Standing] : standing [Walking] : walking [] : no [FreeTextEntry7] : b/l legs

## 2022-10-17 NOTE — PHYSICAL EXAM
[de-identified] : PHYSICAL EXAM\par \par Constitutional: \par Appears well, no apparent distress\par Ability to communicate: Normal\par Respiratory: non-labored breathing\par Skin: no rash noted\par Head: normocephalic, atraumatic\par Neck: no visible thyroid enlargement\par Eyes: extraocular movements intact\par Neurologic: alert and oriented x3\par Psychiatric: normal mood, affect, and behavior\par \par Cervical:\par Palpation: right trapezial spasm, right trapezius tenderness and right paracervical tenderness\par ROM: Diminished range of motion in all plains.  Patient notes pain at extremes of extension and rotation to right.  Stiffness at extremes of extension and rotation to the right\par MMT: Motor exam is 5/5 through out bilateral upper extremities.\par Sensation: Light touch and pain is intact throughout bilateral upper extremities.\par Reflexes: No sustained clonus.\par Special Testing: Positive right  Spurling test \par \par Assessemnt:\par Radiculopathy of cervical region (M54.12)\par Cervicalgia (M54.2)\par \par \par Plan:\par After discussing various treatment options with the patient including but not limited to oral medications, physical therapy, exercise modalities as well as interventional spinal injections, we have decided with the following plan:\par \par MRI Review \par I personally reviewed the MRI/CT scan images and agree with the radiologist's report.  The radiological findings were discussed with the patient. \par  \par \par .\par \par

## 2022-10-24 ENCOUNTER — FORM ENCOUNTER (OUTPATIENT)
Age: 67
End: 2022-10-24

## 2022-10-25 ENCOUNTER — APPOINTMENT (OUTPATIENT)
Dept: MRI IMAGING | Facility: CLINIC | Age: 67
End: 2022-10-25

## 2022-10-25 PROCEDURE — 72141 MRI NECK SPINE W/O DYE: CPT

## 2022-11-18 ENCOUNTER — OUTPATIENT (OUTPATIENT)
Dept: OUTPATIENT SERVICES | Facility: HOSPITAL | Age: 67
LOS: 1 days | Discharge: HOME | End: 2022-11-18

## 2022-11-18 VITALS
WEIGHT: 301.59 LBS | RESPIRATION RATE: 18 BRPM | DIASTOLIC BLOOD PRESSURE: 78 MMHG | HEIGHT: 71 IN | OXYGEN SATURATION: 98 % | TEMPERATURE: 99 F | SYSTOLIC BLOOD PRESSURE: 137 MMHG | HEART RATE: 72 BPM

## 2022-11-18 DIAGNOSIS — E03.9 HYPOTHYROIDISM, UNSPECIFIED: ICD-10-CM

## 2022-11-18 DIAGNOSIS — M17.11 UNILATERAL PRIMARY OSTEOARTHRITIS, RIGHT KNEE: ICD-10-CM

## 2022-11-18 DIAGNOSIS — E78.5 HYPERLIPIDEMIA, UNSPECIFIED: ICD-10-CM

## 2022-11-18 DIAGNOSIS — Z90.5 ACQUIRED ABSENCE OF KIDNEY: Chronic | ICD-10-CM

## 2022-11-18 DIAGNOSIS — Z87.11 PERSONAL HISTORY OF PEPTIC ULCER DISEASE: Chronic | ICD-10-CM

## 2022-11-18 DIAGNOSIS — Z01.818 ENCOUNTER FOR OTHER PREPROCEDURAL EXAMINATION: ICD-10-CM

## 2022-11-18 DIAGNOSIS — D49.4 NEOPLASM OF UNSPECIFIED BEHAVIOR OF BLADDER: Chronic | ICD-10-CM

## 2022-11-18 DIAGNOSIS — Z98.890 OTHER SPECIFIED POSTPROCEDURAL STATES: Chronic | ICD-10-CM

## 2022-11-18 DIAGNOSIS — I10 ESSENTIAL (PRIMARY) HYPERTENSION: ICD-10-CM

## 2022-11-18 DIAGNOSIS — K60.2 ANAL FISSURE, UNSPECIFIED: Chronic | ICD-10-CM

## 2022-11-18 LAB
A1C WITH ESTIMATED AVERAGE GLUCOSE RESULT: 6 % — HIGH (ref 4–5.6)
ALBUMIN SERPL ELPH-MCNC: 3.7 G/DL — SIGNIFICANT CHANGE UP (ref 3.3–5)
ALP SERPL-CCNC: 53 U/L — SIGNIFICANT CHANGE UP (ref 40–120)
ALT FLD-CCNC: 57 U/L — SIGNIFICANT CHANGE UP (ref 12–78)
ANION GAP SERPL CALC-SCNC: 5 MMOL/L — SIGNIFICANT CHANGE UP (ref 5–17)
APTT BLD: 37.3 SEC — HIGH (ref 27.5–35.5)
AST SERPL-CCNC: 30 U/L — SIGNIFICANT CHANGE UP (ref 15–37)
BASOPHILS # BLD AUTO: 0.06 K/UL — SIGNIFICANT CHANGE UP (ref 0–0.2)
BASOPHILS NFR BLD AUTO: 0.9 % — SIGNIFICANT CHANGE UP (ref 0–2)
BILIRUB SERPL-MCNC: 0.4 MG/DL — SIGNIFICANT CHANGE UP (ref 0.2–1.2)
BLD GP AB SCN SERPL QL: SIGNIFICANT CHANGE UP
BUN SERPL-MCNC: 23 MG/DL — SIGNIFICANT CHANGE UP (ref 7–23)
CALCIUM SERPL-MCNC: 9.3 MG/DL — SIGNIFICANT CHANGE UP (ref 8.5–10.1)
CHLORIDE SERPL-SCNC: 109 MMOL/L — HIGH (ref 96–108)
CO2 SERPL-SCNC: 25 MMOL/L — SIGNIFICANT CHANGE UP (ref 22–31)
CREAT SERPL-MCNC: 1.44 MG/DL — HIGH (ref 0.5–1.3)
EGFR: 53 ML/MIN/1.73M2 — LOW
EOSINOPHIL # BLD AUTO: 0.13 K/UL — SIGNIFICANT CHANGE UP (ref 0–0.5)
EOSINOPHIL NFR BLD AUTO: 2 % — SIGNIFICANT CHANGE UP (ref 0–6)
ESTIMATED AVERAGE GLUCOSE: 126 MG/DL — HIGH (ref 68–114)
GLUCOSE SERPL-MCNC: 109 MG/DL — HIGH (ref 70–99)
HCT VFR BLD CALC: 52.1 % — HIGH (ref 39–50)
HGB BLD-MCNC: 16.9 G/DL — SIGNIFICANT CHANGE UP (ref 13–17)
IMM GRANULOCYTES NFR BLD AUTO: 0.5 % — SIGNIFICANT CHANGE UP (ref 0–0.9)
INR BLD: 0.98 RATIO — SIGNIFICANT CHANGE UP (ref 0.88–1.16)
LYMPHOCYTES # BLD AUTO: 1.54 K/UL — SIGNIFICANT CHANGE UP (ref 1–3.3)
LYMPHOCYTES # BLD AUTO: 23.5 % — SIGNIFICANT CHANGE UP (ref 13–44)
MCHC RBC-ENTMCNC: 29.4 PG — SIGNIFICANT CHANGE UP (ref 27–34)
MCHC RBC-ENTMCNC: 32.4 G/DL — SIGNIFICANT CHANGE UP (ref 32–36)
MCV RBC AUTO: 90.8 FL — SIGNIFICANT CHANGE UP (ref 80–100)
MONOCYTES # BLD AUTO: 0.58 K/UL — SIGNIFICANT CHANGE UP (ref 0–0.9)
MONOCYTES NFR BLD AUTO: 8.9 % — SIGNIFICANT CHANGE UP (ref 2–14)
MRSA PCR RESULT.: SIGNIFICANT CHANGE UP
NEUTROPHILS # BLD AUTO: 4.21 K/UL — SIGNIFICANT CHANGE UP (ref 1.8–7.4)
NEUTROPHILS NFR BLD AUTO: 64.2 % — SIGNIFICANT CHANGE UP (ref 43–77)
NRBC # BLD: 0 /100 WBCS — SIGNIFICANT CHANGE UP (ref 0–0)
PLATELET # BLD AUTO: 203 K/UL — SIGNIFICANT CHANGE UP (ref 150–400)
POTASSIUM SERPL-MCNC: 4.5 MMOL/L — SIGNIFICANT CHANGE UP (ref 3.5–5.3)
POTASSIUM SERPL-SCNC: 4.5 MMOL/L — SIGNIFICANT CHANGE UP (ref 3.5–5.3)
PROT SERPL-MCNC: 7.6 GM/DL — SIGNIFICANT CHANGE UP (ref 6–8.3)
PROTHROM AB SERPL-ACNC: 11.6 SEC — SIGNIFICANT CHANGE UP (ref 10.5–13.4)
RBC # BLD: 5.74 M/UL — SIGNIFICANT CHANGE UP (ref 4.2–5.8)
RBC # FLD: 14.6 % — HIGH (ref 10.3–14.5)
S AUREUS DNA NOSE QL NAA+PROBE: SIGNIFICANT CHANGE UP
SODIUM SERPL-SCNC: 139 MMOL/L — SIGNIFICANT CHANGE UP (ref 135–145)
VIT D25+D1,25 OH+D1,25 PNL SERPL-MCNC: 32.4 PG/ML — SIGNIFICANT CHANGE UP (ref 19.9–79.3)
WBC # BLD: 6.55 K/UL — SIGNIFICANT CHANGE UP (ref 3.8–10.5)
WBC # FLD AUTO: 6.55 K/UL — SIGNIFICANT CHANGE UP (ref 3.8–10.5)

## 2022-11-18 PROCEDURE — 93010 ELECTROCARDIOGRAM REPORT: CPT

## 2022-11-18 RX ORDER — ATORVASTATIN CALCIUM 80 MG/1
1 TABLET, FILM COATED ORAL
Qty: 0 | Refills: 0 | DISCHARGE

## 2022-11-18 NOTE — OCCUPATIONAL THERAPY INITIAL EVALUATION ADULT - ADDITIONAL COMMENTS
Pt lives with wife (Who can assist post op) in a private house with 9 steps to enter with a R rail. Once inside, the pt has 3 steps with a R rail to reach the main floor where the bedroom and bathroom is. The pts bathroom has a tub/shower combination, retractable shower head, standard toilet and no grab bars. The pt owns 3:1 commode from L TKA in April 2021. The pt ambulates with no device and owns a rolling walker and a straight cane. The pt daily pain is a 1/10 at rest and a 7/10 with movement. The pt manages the pain with rest and meloxicam. The pt has no recent outpatient PT, no recent falls and no buckling of the knees. The pt wears glasses all the time, R handed, drives and has no hearing impairments.

## 2022-11-18 NOTE — PHYSICAL THERAPY INITIAL EVALUATION ADULT - ADDITIONAL COMMENTS
Pt lives with his wife (whom can provide assist upon D/C home) in a private home, 9 entry steps c R rail up, inside has 3 steps c R rail up. Pt has a tub/shower combo with a retractable shower head, standard toilet seat height, & no grab bar. Pt states he is currently independent with all functional mobility including community ambulation without device. Pt states he is independent with ADL's as well. Pt reports daily 1/10 pain at rest & states it is worse with any activity 7/10. Pt is right hand dominant, wears eye glasses, drives, & is retired.  Pt denies taking narcotics for pain management. Goal of therapy: manage pain & improve functional mobility. Pt lives with his wife (whom can provide assist upon D/C home) in a private home, 9 entry steps c R rail up, inside has 3 steps c R rail up. Pt has a tub/shower combo with a retractable shower head, standard toilet seat height, & no grab bar. Pt states he is currently independent with all functional mobility including community ambulation without device. Pt has own cane, rolling walker, and commode(all in good condition). Pt states he is independent with ADL's as well. Pt reports daily 1/10 pain at rest & states it is worse with any activity 7/10. Pt is right hand dominant, wears eye glasses, drives, & is retired.  Pt denies taking narcotics for pain management. Goal of therapy: manage pain & improve functional mobility.

## 2022-11-18 NOTE — H&P PST ADULT - ASSESSMENT
66 y/o M PMH HTN, HLD, and hypothyroidism c/o pain and limited ROM to right knee and is scheduled for a robotic assisted right total knee arthroplasty with JHONATHAN on 2022 with Dr.Germano CAPRINI SCORE [CLOT]    AGE RELATED RISK FACTORS                                                       MOBILITY RELATED FACTORS  [ ] Age 41-60 years                                            (1 Point)                  [ ] Bed rest                                                        (1 Point)  [x ] Age: 61-74 years                                           (2 Points)                 [ ] Plaster cast                                                   (2 Points)  [ ] Age= 75 years                                              (3 Points)                 [ ] Bed bound for more than 72 hours                 (2 Points)    DISEASE RELATED RISK FACTORS                                               GENDER SPECIFIC FACTORS  [ ] Edema in the lower extremities                       (1 Point)                  [ ] Pregnancy                                                     (1 Point)  [ ] Varicose veins                                               (1 Point)                  [ ] Post-partum < 6 weeks                                   (1 Point)             [ x] BMI > 25 Kg/m2                                            (1 Point)                  [ ] Hormonal therapy  or oral contraception          (1 Point)                 [ ] Sepsis (in the previous month)                        (1 Point)                  [ ] History of pregnancy complications                 (1 point)  [ ] Pneumonia or serious lung disease                                               [ ] Unexplained or recurrent                     (1 Point)           (in the previous month)                               (1 Point)  [ ] Abnormal pulmonary function test                     (1 Point)                 SURGERY RELATED RISK FACTORS  [ ] Acute myocardial infarction                              (1 Point)                 [ ]  Section                                             (1 Point)  [ ] Congestive heart failure (in the previous month)  (1 Point)               [ ] Minor surgery                                                  (1 Point)   [ ] Inflammatory bowel disease                             (1 Point)                 [ ] Arthroscopic surgery                                        (2 Points)  [ ] Central venous access                                      (2 Points)                [ ] General surgery lasting more than 45 minutes   (2 Points)       [ ] Stroke (in the previous month)                          (5 Points)               [x ] Elective arthroplasty                                         (5 Points)                                                                                                                                               HEMATOLOGY RELATED FACTORS                                                 TRAUMA RELATED RISK FACTORS  [ ] Prior episodes of VTE                                     (3 Points)                [ ] Fracture of the hip, pelvis, or leg                       (5 Points)  [ ] Positive family history for VTE                         (3 Points)                 [ ] Acute spinal cord injury (in the previous month)  (5 Points)  [ ] Prothrombin 38786 A                                     (3 Points)                 [ ] Paralysis  (less than 1 month)                             (5 Points)  [ ] Factor V Leiden                                             (3 Points)                  [ ] Multiple Trauma within 1 month                        (5 Points)  [ ] Lupus anticoagulants                                     (3 Points)                                                           [ ] Anticardiolipin antibodies                               (3 Points)                                                       [ ] High homocysteine in the blood                      (3 Points)                                             [ ] Other congenital or acquired thrombophilia      (3 Points)                                                [ ] Heparin induced thrombocytopenia                  (3 Points)                                          Total Score [  8    ]    Caprini Score 0 - 2:  Low Risk, No VTE Prophylaxis required for most patients, encourage ambulation  Caprini Score 3 - 6:  At Risk, pharmacologic VTE prophylaxis is indicated for most patients (in the absence of a contraindication)  Caprini Score Greater than or = 7:  High Risk, pharmacologic VTE prophylaxis is indicated for most patients (in the absence of a contraindication)

## 2022-11-18 NOTE — H&P PST ADULT - HISTORY OF PRESENT ILLNESS
65 year old male with a past medical history of HTN, HLD, and hypothyroidism c/o pain and limited ROM to left knee.  He is scheduled for a robotic assisted left total knee arthroplasty with JHONATHAN on 4/14/2021.    He denies fever, cough, SOB, recent travels, and sick contacts.    Goal: Go up and downstairs.  66 y/o M PMH HTN, HLD, and hypothyroidism c/o pain and limited ROM to right knee and is scheduled for a robotic assisted right total knee arthroplasty with JHONATHAN on 12/08/2022 with .     He denies fever, cough, SOB, recent travels, and sick contacts.    Goal: Go up and downstairs.

## 2022-11-18 NOTE — OCCUPATIONAL THERAPY INITIAL EVALUATION ADULT - GENERAL OBSERVATIONS, REHAB EVAL
Pt is a 68 y/o male slated for elective surgery for right TKR with MD Reyes on 12/08/22 due to OA and chronic pain. Pt denied any falls in the past 3-6 months.

## 2022-11-18 NOTE — H&P PST ADULT - NSANTHOSAYNRD_GEN_A_CORE
No. VEENA screening performed.  STOP BANG Legend: 0-2 = LOW Risk; 3-4 = INTERMEDIATE Risk; 5-8 = HIGH Risk Yes

## 2022-11-18 NOTE — H&P PST ADULT - PROBLEM SELECTOR PLAN 1
Labs-CBC, BMP, PT/INR, PTT ,T&S, Nose Cx, EKG   M/C required    Preop Hibiclens x 3 day instructions reviewed and given. Instructed on if Cx is positive use Mupirocin 5 days and checklist given in booklet   Take routine meds DOS with small sips of water, avoid NSAIDs and OTC supplements, verbalized understanding information on proper nutrition, increase protein and better food choices provided in booklet.    Ensure clear given   Pt aware COVID-19 PCR test needed 3-5 days prior to surgery   Anesthesiologist to review PST labs, EKG, required clearances, and optimization for surgery

## 2022-11-18 NOTE — PHYSICAL THERAPY INITIAL EVALUATION ADULT - PERTINENT HX OF CURRENT PROBLEM, REHAB EVAL
Patient attends pre-op testing today following consult c Dr. Reyes due to chronic pain to right knee. Elective R TKA is now scheduled in this facility for 12/8/2022.

## 2022-11-18 NOTE — H&P PST ADULT - MUSCULOSKELETAL COMMENTS
- right knee

## 2022-11-30 ENCOUNTER — APPOINTMENT (OUTPATIENT)
Dept: PAIN MANAGEMENT | Facility: CLINIC | Age: 67
End: 2022-11-30

## 2022-11-30 VITALS — WEIGHT: 285 LBS | HEIGHT: 71 IN | BODY MASS INDEX: 39.9 KG/M2

## 2022-11-30 PROCEDURE — 99214 OFFICE O/P EST MOD 30 MIN: CPT

## 2022-11-30 NOTE — PHYSICAL EXAM
[de-identified] : PHYSICAL EXAM\par \par Constitutional: \par Appears well, no apparent distress\par Ability to communicate: Normal\par Respiratory: non-labored breathing\par Skin: no rash noted\par Head: normocephalic, atraumatic\par Neck: no visible thyroid enlargement\par Eyes: extraocular movements intact\par Neurologic: alert and oriented x3\par Psychiatric: normal mood, affect, and behavior\par \par Cervical:\par Palpation: bilateral trapezial spasm, bilateral trapezius tenderness and bilateral paracervical tenderness\par ROM: Diminished range of motion in all plains.  Patient notes pain at extremes of extension and rotation bilaterally.  Stiffness at extremes of extension and rotation bilaterally\par MMT: Motor exam is 5/5 through out bilateral upper extremities.\par Sensation: Light touch and pain is intact throughout bilateral upper extremities.\par Reflexes: No sustained clonus.\par Special Testing: Positive bilateral Spurling test \par \par Assessemnt:\par Radiculopathy of cervical region (M54.12)\par Cervicalgia (M54.2)\par \par \par Plan:\par After discussing various treatment options with the patient including but not limited to oral medications, physical therapy, exercise modalities as well as interventional spinal injections, we have decided with the following plan:\par \par MRI Review \par I personally reviewed the MRI/CT scan images and agree with the radiologist's report.  The radiological findings were discussed with the patient. \par  \par \par .\par \par The risks, benefits and alternatives of the proposed procedure were explained in detail with the patient.  The risks outlined include but are not limited to infection, bleeding, post dural puncture headache, nerve injury, a temporary increase in pain, failure to resolve symptoms, allergic reaction, symptom recurrence, and possible elevation of blood sugar.  All questions were answered to patient's satisfaction and he/she verbalized an understanding.\par \par Follow up 1-2 weeks post injection for re-evaluation.\par \par Continue home exercises, stretching, activity modification, physical therapy, and conservative care.\par \par \par \par \par

## 2022-11-30 NOTE — DISCUSSION/SUMMARY
[de-identified] : right cervical radiculitis\par mri reviewed. multiple hnp\par proceed with melvin

## 2022-11-30 NOTE — HISTORY OF PRESENT ILLNESS
[7] : 7 [3] : 3 [Radiating] : radiating [Tingling] : tingling [de-identified] : pt is following up for mri c spine results, the pain goes into the shoulder and arms he is having numbness and tingling  [] : no [FreeTextEntry6] : numbness [FreeTextEntry9] : meloxicam

## 2022-12-07 ENCOUNTER — FORM ENCOUNTER (OUTPATIENT)
Age: 67
End: 2022-12-07

## 2022-12-07 RX ORDER — PANTOPRAZOLE SODIUM 20 MG/1
40 TABLET, DELAYED RELEASE ORAL
Refills: 0 | Status: DISCONTINUED | OUTPATIENT
Start: 2022-12-08 | End: 2022-12-09

## 2022-12-07 RX ORDER — ZOLPIDEM TARTRATE 10 MG/1
5 TABLET ORAL AT BEDTIME
Refills: 0 | Status: DISCONTINUED | OUTPATIENT
Start: 2022-12-08 | End: 2022-12-09

## 2022-12-07 RX ORDER — ACETAMINOPHEN 500 MG
975 TABLET ORAL EVERY 8 HOURS
Refills: 0 | Status: DISCONTINUED | OUTPATIENT
Start: 2022-12-08 | End: 2022-12-09

## 2022-12-07 RX ORDER — ACETAMINOPHEN 500 MG
1000 TABLET ORAL ONCE
Refills: 0 | Status: DISCONTINUED | OUTPATIENT
Start: 2022-12-08 | End: 2022-12-09

## 2022-12-08 ENCOUNTER — TRANSCRIPTION ENCOUNTER (OUTPATIENT)
Age: 67
End: 2022-12-08

## 2022-12-08 ENCOUNTER — INPATIENT (INPATIENT)
Facility: HOSPITAL | Age: 67
LOS: 0 days | Discharge: HOME | End: 2022-12-09
Attending: ORTHOPAEDIC SURGERY | Admitting: ORTHOPAEDIC SURGERY

## 2022-12-08 ENCOUNTER — APPOINTMENT (OUTPATIENT)
Dept: ORTHOPEDIC SURGERY | Facility: HOSPITAL | Age: 67
End: 2022-12-08

## 2022-12-08 VITALS
OXYGEN SATURATION: 96 % | TEMPERATURE: 98 F | HEART RATE: 89 BPM | RESPIRATION RATE: 18 BRPM | WEIGHT: 301.59 LBS | SYSTOLIC BLOOD PRESSURE: 117 MMHG | HEIGHT: 71 IN | DIASTOLIC BLOOD PRESSURE: 80 MMHG

## 2022-12-08 DIAGNOSIS — D49.4 NEOPLASM OF UNSPECIFIED BEHAVIOR OF BLADDER: Chronic | ICD-10-CM

## 2022-12-08 DIAGNOSIS — Z96.652 PRESENCE OF LEFT ARTIFICIAL KNEE JOINT: Chronic | ICD-10-CM

## 2022-12-08 DIAGNOSIS — K60.2 ANAL FISSURE, UNSPECIFIED: Chronic | ICD-10-CM

## 2022-12-08 DIAGNOSIS — Z98.890 OTHER SPECIFIED POSTPROCEDURAL STATES: Chronic | ICD-10-CM

## 2022-12-08 DIAGNOSIS — Z87.11 PERSONAL HISTORY OF PEPTIC ULCER DISEASE: Chronic | ICD-10-CM

## 2022-12-08 DIAGNOSIS — Z90.5 ACQUIRED ABSENCE OF KIDNEY: Chronic | ICD-10-CM

## 2022-12-08 LAB
ANION GAP SERPL CALC-SCNC: 6 MMOL/L — SIGNIFICANT CHANGE UP (ref 5–17)
BUN SERPL-MCNC: 25 MG/DL — HIGH (ref 7–23)
CALCIUM SERPL-MCNC: 9.3 MG/DL — SIGNIFICANT CHANGE UP (ref 8.5–10.1)
CHLORIDE SERPL-SCNC: 111 MMOL/L — HIGH (ref 96–108)
CO2 SERPL-SCNC: 22 MMOL/L — SIGNIFICANT CHANGE UP (ref 22–31)
CREAT SERPL-MCNC: 1.71 MG/DL — HIGH (ref 0.5–1.3)
EGFR: 43 ML/MIN/1.73M2 — LOW
GLUCOSE SERPL-MCNC: 111 MG/DL — HIGH (ref 70–99)
HCT VFR BLD CALC: 48.4 % — SIGNIFICANT CHANGE UP (ref 39–50)
HGB BLD-MCNC: 15.8 G/DL — SIGNIFICANT CHANGE UP (ref 13–17)
MCHC RBC-ENTMCNC: 29.3 PG — SIGNIFICANT CHANGE UP (ref 27–34)
MCHC RBC-ENTMCNC: 32.6 G/DL — SIGNIFICANT CHANGE UP (ref 32–36)
MCV RBC AUTO: 89.6 FL — SIGNIFICANT CHANGE UP (ref 80–100)
NRBC # BLD: 0 /100 WBCS — SIGNIFICANT CHANGE UP (ref 0–0)
PLATELET # BLD AUTO: 202 K/UL — SIGNIFICANT CHANGE UP (ref 150–400)
POTASSIUM SERPL-MCNC: 5.1 MMOL/L — SIGNIFICANT CHANGE UP (ref 3.5–5.3)
POTASSIUM SERPL-SCNC: 5.1 MMOL/L — SIGNIFICANT CHANGE UP (ref 3.5–5.3)
RBC # BLD: 5.4 M/UL — SIGNIFICANT CHANGE UP (ref 4.2–5.8)
RBC # FLD: 14.5 % — SIGNIFICANT CHANGE UP (ref 10.3–14.5)
SODIUM SERPL-SCNC: 139 MMOL/L — SIGNIFICANT CHANGE UP (ref 135–145)
WBC # BLD: 8.26 K/UL — SIGNIFICANT CHANGE UP (ref 3.8–10.5)
WBC # FLD AUTO: 8.26 K/UL — SIGNIFICANT CHANGE UP (ref 3.8–10.5)

## 2022-12-08 PROCEDURE — 73560 X-RAY EXAM OF KNEE 1 OR 2: CPT | Mod: 26,RT

## 2022-12-08 PROCEDURE — 20610 DRAIN/INJ JOINT/BURSA W/O US: CPT | Mod: 59,RT

## 2022-12-08 PROCEDURE — 27447 TOTAL KNEE ARTHROPLASTY: CPT | Mod: AS,RT

## 2022-12-08 PROCEDURE — 20985 CPTR-ASST DIR MS PX: CPT

## 2022-12-08 PROCEDURE — 27447 TOTAL KNEE ARTHROPLASTY: CPT | Mod: RT

## 2022-12-08 DEVICE — BASEPLATE TIB TRIATHLON TRITAN SZ 6: Type: IMPLANTABLE DEVICE | Site: RIGHT KNEE | Status: FUNCTIONAL

## 2022-12-08 DEVICE — INSERT TIB BEARING CS X3 SZ 6 9MM: Type: IMPLANTABLE DEVICE | Site: RIGHT KNEE | Status: FUNCTIONAL

## 2022-12-08 DEVICE — MAKO BONE PIN 3.2MM X 110MM: Type: IMPLANTABLE DEVICE | Site: RIGHT KNEE | Status: FUNCTIONAL

## 2022-12-08 DEVICE — COMP FEM CRUCIATE RETAINING: Type: IMPLANTABLE DEVICE | Site: RIGHT KNEE | Status: FUNCTIONAL

## 2022-12-08 DEVICE — MAKO BONE PIN 3.2MM X 140MM: Type: IMPLANTABLE DEVICE | Site: RIGHT KNEE | Status: FUNCTIONAL

## 2022-12-08 DEVICE — PATELLA TRIATHLON ASSYM SZ A3X10MM: Type: IMPLANTABLE DEVICE | Site: RIGHT KNEE | Status: FUNCTIONAL

## 2022-12-08 RX ORDER — LEVOTHYROXINE SODIUM 125 MCG
125 TABLET ORAL DAILY
Refills: 0 | Status: DISCONTINUED | OUTPATIENT
Start: 2022-12-08 | End: 2022-12-08

## 2022-12-08 RX ORDER — ACETAMINOPHEN 500 MG
2 TABLET ORAL
Qty: 0 | Refills: 0 | DISCHARGE

## 2022-12-08 RX ORDER — SODIUM CHLORIDE 9 MG/ML
500 INJECTION INTRAMUSCULAR; INTRAVENOUS; SUBCUTANEOUS ONCE
Refills: 0 | Status: COMPLETED | OUTPATIENT
Start: 2022-12-08 | End: 2022-12-08

## 2022-12-08 RX ORDER — ZOLPIDEM TARTRATE 10 MG/1
1 TABLET ORAL
Qty: 0 | Refills: 0 | DISCHARGE

## 2022-12-08 RX ORDER — POLYETHYLENE GLYCOL 3350 17 G/17G
17 POWDER, FOR SOLUTION ORAL AT BEDTIME
Refills: 0 | Status: DISCONTINUED | OUTPATIENT
Start: 2022-12-08 | End: 2022-12-09

## 2022-12-08 RX ORDER — SENNA PLUS 8.6 MG/1
2 TABLET ORAL AT BEDTIME
Refills: 0 | Status: DISCONTINUED | OUTPATIENT
Start: 2022-12-08 | End: 2022-12-09

## 2022-12-08 RX ORDER — OXYCODONE HYDROCHLORIDE 5 MG/1
5 TABLET ORAL EVERY 4 HOURS
Refills: 0 | Status: DISCONTINUED | OUTPATIENT
Start: 2022-12-08 | End: 2022-12-08

## 2022-12-08 RX ORDER — DEXAMETHASONE 0.5 MG/5ML
10 ELIXIR ORAL ONCE
Refills: 0 | Status: CANCELLED | OUTPATIENT
Start: 2022-12-09 | End: 2022-12-08

## 2022-12-08 RX ORDER — LEVOTHYROXINE SODIUM 125 MCG
1 TABLET ORAL
Qty: 0 | Refills: 0 | DISCHARGE

## 2022-12-08 RX ORDER — FENOFIBRATE,MICRONIZED 130 MG
1 CAPSULE ORAL
Qty: 0 | Refills: 0 | DISCHARGE

## 2022-12-08 RX ORDER — SENNA PLUS 8.6 MG/1
2 TABLET ORAL AT BEDTIME
Refills: 0 | Status: DISCONTINUED | OUTPATIENT
Start: 2022-12-08 | End: 2022-12-08

## 2022-12-08 RX ORDER — CEFAZOLIN SODIUM 1 G
2000 VIAL (EA) INJECTION EVERY 8 HOURS
Refills: 0 | Status: COMPLETED | OUTPATIENT
Start: 2022-12-08 | End: 2022-12-09

## 2022-12-08 RX ORDER — MAGNESIUM HYDROXIDE 400 MG/1
30 TABLET, CHEWABLE ORAL DAILY
Refills: 0 | Status: DISCONTINUED | OUTPATIENT
Start: 2022-12-08 | End: 2022-12-08

## 2022-12-08 RX ORDER — SODIUM CHLORIDE 9 MG/ML
1000 INJECTION INTRAMUSCULAR; INTRAVENOUS; SUBCUTANEOUS
Refills: 0 | Status: DISCONTINUED | OUTPATIENT
Start: 2022-12-08 | End: 2022-12-08

## 2022-12-08 RX ORDER — POLYETHYLENE GLYCOL 3350 17 G/17G
17 POWDER, FOR SOLUTION ORAL AT BEDTIME
Refills: 0 | Status: DISCONTINUED | OUTPATIENT
Start: 2022-12-08 | End: 2022-12-08

## 2022-12-08 RX ORDER — SODIUM CHLORIDE 9 MG/ML
500 INJECTION INTRAMUSCULAR; INTRAVENOUS; SUBCUTANEOUS ONCE
Refills: 0 | Status: DISCONTINUED | OUTPATIENT
Start: 2022-12-08 | End: 2022-12-08

## 2022-12-08 RX ORDER — FENOFIBRATE,MICRONIZED 130 MG
145 CAPSULE ORAL DAILY
Refills: 0 | Status: DISCONTINUED | OUTPATIENT
Start: 2022-12-08 | End: 2022-12-08

## 2022-12-08 RX ORDER — ACETAMINOPHEN 500 MG
650 TABLET ORAL ONCE
Refills: 0 | Status: COMPLETED | OUTPATIENT
Start: 2022-12-08 | End: 2022-12-08

## 2022-12-08 RX ORDER — ZOLPIDEM TARTRATE 10 MG/1
5 TABLET ORAL AT BEDTIME
Refills: 0 | Status: DISCONTINUED | OUTPATIENT
Start: 2022-12-08 | End: 2022-12-08

## 2022-12-08 RX ORDER — POTASSIUM CITRATE MONOHYDRATE 100 %
2 POWDER (GRAM) MISCELLANEOUS
Qty: 0 | Refills: 0 | DISCHARGE

## 2022-12-08 RX ORDER — ASPIRIN/CALCIUM CARB/MAGNESIUM 324 MG
81 TABLET ORAL
Refills: 0 | Status: DISCONTINUED | OUTPATIENT
Start: 2022-12-09 | End: 2022-12-09

## 2022-12-08 RX ORDER — LOSARTAN POTASSIUM 100 MG/1
50 TABLET, FILM COATED ORAL DAILY
Refills: 0 | Status: DISCONTINUED | OUTPATIENT
Start: 2022-12-08 | End: 2022-12-08

## 2022-12-08 RX ORDER — LANOLIN ALCOHOL/MO/W.PET/CERES
3 CREAM (GRAM) TOPICAL AT BEDTIME
Refills: 0 | Status: DISCONTINUED | OUTPATIENT
Start: 2022-12-08 | End: 2022-12-09

## 2022-12-08 RX ORDER — HYDROMORPHONE HYDROCHLORIDE 2 MG/ML
2 INJECTION INTRAMUSCULAR; INTRAVENOUS; SUBCUTANEOUS EVERY 4 HOURS
Refills: 0 | Status: DISCONTINUED | OUTPATIENT
Start: 2022-12-08 | End: 2022-12-09

## 2022-12-08 RX ORDER — DEXAMETHASONE 0.5 MG/5ML
10 ELIXIR ORAL ONCE
Refills: 0 | Status: COMPLETED | OUTPATIENT
Start: 2022-12-09 | End: 2022-12-09

## 2022-12-08 RX ORDER — CEFAZOLIN SODIUM 1 G
3000 VIAL (EA) INJECTION EVERY 8 HOURS
Refills: 0 | Status: DISCONTINUED | OUTPATIENT
Start: 2022-12-08 | End: 2022-12-08

## 2022-12-08 RX ORDER — SODIUM CHLORIDE 9 MG/ML
1000 INJECTION INTRAMUSCULAR; INTRAVENOUS; SUBCUTANEOUS ONCE
Refills: 0 | Status: COMPLETED | OUTPATIENT
Start: 2022-12-08 | End: 2022-12-08

## 2022-12-08 RX ORDER — ONDANSETRON 8 MG/1
4 TABLET, FILM COATED ORAL EVERY 6 HOURS
Refills: 0 | Status: DISCONTINUED | OUTPATIENT
Start: 2022-12-08 | End: 2022-12-08

## 2022-12-08 RX ORDER — SODIUM CHLORIDE 9 MG/ML
1000 INJECTION, SOLUTION INTRAVENOUS
Refills: 0 | Status: DISCONTINUED | OUTPATIENT
Start: 2022-12-08 | End: 2022-12-08

## 2022-12-08 RX ORDER — PANTOPRAZOLE SODIUM 20 MG/1
40 TABLET, DELAYED RELEASE ORAL
Refills: 0 | Status: DISCONTINUED | OUTPATIENT
Start: 2022-12-08 | End: 2022-12-08

## 2022-12-08 RX ORDER — OXYCODONE HYDROCHLORIDE 5 MG/1
10 TABLET ORAL EVERY 4 HOURS
Refills: 0 | Status: DISCONTINUED | OUTPATIENT
Start: 2022-12-08 | End: 2022-12-08

## 2022-12-08 RX ORDER — ATORVASTATIN CALCIUM 80 MG/1
1 TABLET, FILM COATED ORAL
Qty: 0 | Refills: 0 | DISCHARGE

## 2022-12-08 RX ORDER — HYDROMORPHONE HYDROCHLORIDE 2 MG/ML
4 INJECTION INTRAMUSCULAR; INTRAVENOUS; SUBCUTANEOUS EVERY 4 HOURS
Refills: 0 | Status: DISCONTINUED | OUTPATIENT
Start: 2022-12-08 | End: 2022-12-09

## 2022-12-08 RX ORDER — ONDANSETRON 8 MG/1
4 TABLET, FILM COATED ORAL ONCE
Refills: 0 | Status: DISCONTINUED | OUTPATIENT
Start: 2022-12-08 | End: 2022-12-08

## 2022-12-08 RX ORDER — SODIUM CHLORIDE 9 MG/ML
1000 INJECTION INTRAMUSCULAR; INTRAVENOUS; SUBCUTANEOUS
Refills: 0 | Status: DISCONTINUED | OUTPATIENT
Start: 2022-12-08 | End: 2022-12-09

## 2022-12-08 RX ORDER — MULTIVIT-MIN/FERROUS GLUCONATE 9 MG/15 ML
1 LIQUID (ML) ORAL
Qty: 0 | Refills: 0 | DISCHARGE

## 2022-12-08 RX ORDER — ONDANSETRON 8 MG/1
4 TABLET, FILM COATED ORAL EVERY 6 HOURS
Refills: 0 | Status: DISCONTINUED | OUTPATIENT
Start: 2022-12-08 | End: 2022-12-09

## 2022-12-08 RX ORDER — OLMESARTAN MEDOXOMIL 5 MG/1
1 TABLET, FILM COATED ORAL
Qty: 0 | Refills: 0 | DISCHARGE

## 2022-12-08 RX ORDER — MAGNESIUM HYDROXIDE 400 MG/1
30 TABLET, CHEWABLE ORAL DAILY
Refills: 0 | Status: DISCONTINUED | OUTPATIENT
Start: 2022-12-08 | End: 2022-12-09

## 2022-12-08 RX ORDER — HYDROCHLOROTHIAZIDE 25 MG
1 TABLET ORAL
Qty: 0 | Refills: 0 | DISCHARGE

## 2022-12-08 RX ORDER — ASPIRIN/CALCIUM CARB/MAGNESIUM 324 MG
81 TABLET ORAL
Refills: 0 | Status: CANCELLED | OUTPATIENT
Start: 2022-12-09 | End: 2022-12-08

## 2022-12-08 RX ORDER — SODIUM CHLORIDE 9 MG/ML
3 INJECTION INTRAMUSCULAR; INTRAVENOUS; SUBCUTANEOUS EVERY 8 HOURS
Refills: 0 | Status: DISCONTINUED | OUTPATIENT
Start: 2022-12-08 | End: 2022-12-08

## 2022-12-08 RX ADMIN — SODIUM CHLORIDE 500 MILLILITER(S): 9 INJECTION INTRAMUSCULAR; INTRAVENOUS; SUBCUTANEOUS at 19:59

## 2022-12-08 RX ADMIN — Medication 975 MILLIGRAM(S): at 21:32

## 2022-12-08 RX ADMIN — HYDROMORPHONE HYDROCHLORIDE 2 MILLIGRAM(S): 2 INJECTION INTRAMUSCULAR; INTRAVENOUS; SUBCUTANEOUS at 19:41

## 2022-12-08 RX ADMIN — SODIUM CHLORIDE 100 MILLILITER(S): 9 INJECTION, SOLUTION INTRAVENOUS at 13:00

## 2022-12-08 RX ADMIN — Medication 975 MILLIGRAM(S): at 22:27

## 2022-12-08 RX ADMIN — Medication 650 MILLIGRAM(S): at 09:47

## 2022-12-08 RX ADMIN — SODIUM CHLORIDE 3 MILLILITER(S): 9 INJECTION INTRAMUSCULAR; INTRAVENOUS; SUBCUTANEOUS at 09:48

## 2022-12-08 RX ADMIN — SODIUM CHLORIDE 500 MILLILITER(S): 9 INJECTION INTRAMUSCULAR; INTRAVENOUS; SUBCUTANEOUS at 14:00

## 2022-12-08 RX ADMIN — SENNA PLUS 2 TABLET(S): 8.6 TABLET ORAL at 21:31

## 2022-12-08 RX ADMIN — Medication 100 MILLIGRAM(S): at 18:59

## 2022-12-08 RX ADMIN — POLYETHYLENE GLYCOL 3350 17 GRAM(S): 17 POWDER, FOR SOLUTION ORAL at 21:32

## 2022-12-08 RX ADMIN — SODIUM CHLORIDE 500 MILLILITER(S): 9 INJECTION INTRAMUSCULAR; INTRAVENOUS; SUBCUTANEOUS at 17:16

## 2022-12-08 RX ADMIN — HYDROMORPHONE HYDROCHLORIDE 2 MILLIGRAM(S): 2 INJECTION INTRAMUSCULAR; INTRAVENOUS; SUBCUTANEOUS at 19:00

## 2022-12-08 NOTE — PROGRESS NOTE ADULT - SUBJECTIVE AND OBJECTIVE BOX
Patient is seen and examined at bedside. Denies CP/SOB/Dizziness/N/V/D/HA. Pain is controlled. Still unable to move his legs post spinal anesthesia.     Vital Signs Last 24 Hrs  T(C): 36.4 (08 Dec 2022 16:00), Max: 36.7 (08 Dec 2022 09:15)  T(F): 97.5 (08 Dec 2022 16:00), Max: 98 (08 Dec 2022 09:15)  HR: 86 (08 Dec 2022 15:30) (67 - 89)  BP: 109/75 (08 Dec 2022 15:30) (73/41 - 120/75)  BP(mean): --  RR: 16 (08 Dec 2022 15:30) (14 - 18)  SpO2: 96% (08 Dec 2022 15:30) (94% - 98%)    Parameters below as of 08 Dec 2022 13:00  Patient On (Oxygen Delivery Method): mask, simple face          PHYSICAL EXAM:  General: NAD, WDWN.   Neuro:  Alert & responsive  HEENT: NCAT, EOMI, conjunctiva clear  abd: soft, NT/ND   LE: Dressing C/D/I with ACE wrap in place.   Right LE: ACE bandage C/D/I. Cryocuff in place.  Extremity warm, compartments soft, compressible. No calf tenderness. DP 2+   Left LE:  Extremity warm, compartments soft, compressible. No calf tenderness. DP2+    Labs:                          15.8   8.26  )-----------( 202      ( 08 Dec 2022 13:11 )             48.4       12-08    139  |  111<H>  |  25<H>  ----------------------------<  111<H>  5.1   |  22  |  1.71<H>    Ca    9.3      08 Dec 2022 13:11        A/P: Patient is a 67y y/o Male s/p right TKA, POD # 0  -wound care, knee extension/leg elevation, cryocuff, isometric exercises, new medications reviewed with pt  -Pain control/analgesia reviewed   -Inc spirometry reviewed with pt, demonstrated competence  -DVT prophylaxis with Venodynes/Aspirin  -F/U AM Labs  -PT/OT/WBAT  -complete prophylactic Antibiotic  -medical consult pending   -DC planning: for home tomorrow with home care     Patient is seen and examined at bedside. Denies CP/SOB/Dizziness/N/V/D/HA. Pain is controlled. Still unable to move his legs post spinal anesthesia.     Vital Signs Last 24 Hrs  T(C): 36.4 (08 Dec 2022 16:00), Max: 36.7 (08 Dec 2022 09:15)  T(F): 97.5 (08 Dec 2022 16:00), Max: 98 (08 Dec 2022 09:15)  HR: 86 (08 Dec 2022 15:30) (67 - 89)  BP: 109/75 (08 Dec 2022 15:30) (73/41 - 120/75)  BP(mean): --  RR: 16 (08 Dec 2022 15:30) (14 - 18)  SpO2: 96% (08 Dec 2022 15:30) (94% - 98%)    Parameters below as of 08 Dec 2022 13:00  Patient On (Oxygen Delivery Method): mask, simple face          PHYSICAL EXAM:  General: NAD, WDWN.   Neuro:  Alert & responsive  HEENT: NCAT, EOMI, conjunctiva clear  abd: soft, NT/ND   LE: Dressing C/D/I with ACE wrap in place.   Right LE: ACE bandage C/D/I. Cryocuff in place.  Extremity warm, compartments soft, compressible. No calf tenderness. DP 2+   Left LE:  Extremity warm, compartments soft, compressible. No calf tenderness. DP2+    Labs:                          15.8   8.26  )-----------( 202      ( 08 Dec 2022 13:11 )             48.4       12-08    139  |  111<H>  |  25<H>  ----------------------------<  111<H>  5.1   |  22  |  1.71<H>    Ca    9.3      08 Dec 2022 13:11        A/P: Patient is a 67y y/o Male s/p right TKA, POD # 0  -wound care, knee extension/leg elevation, cryocuff, isometric exercises, new medications reviewed with pt  -Pain control/analgesia reviewed   -Inc spirometry reviewed with pt, demonstrated competence  -DVT prophylaxis with Venodynes/Aspirin  -^creatinine from baseline: NS bolus. Will recheck in AM.   -F/U AM Labs  -PT/OT/WBAT  -complete prophylactic Antibiotic  -medical consult pending   -DC planning: for home tomorrow with home care

## 2022-12-08 NOTE — OCCUPATIONAL THERAPY INITIAL EVALUATION ADULT - ADDITIONAL COMMENTS
Pt lives with wife (Who can assist post op) in a private house with 9 steps to enter with a R rail. Once inside, the pt has 3 steps with a R rail to reach the main floor where the bedroom and bathroom is. The pts bathroom has a tub/shower combination, retractable shower head, standard toilet and no grab bars. The pt owns 3:1 commode from  TKA in April 2021. The pt ambulates with no device and owns a rolling walker and a straight cane. The pt wears glasses all the time, R handed, drives and has no hearing impairments.

## 2022-12-08 NOTE — DISCHARGE NOTE PROVIDER - CARE PROVIDER_API CALL
Kenneth Reyes)  Orthopaedic Surgery  [address] [place] [address]  Phone: [phone]  Fax: [phone]  Follow Up Time:

## 2022-12-08 NOTE — PHYSICAL THERAPY INITIAL EVALUATION ADULT - ADDITIONAL COMMENTS
Verified postop, Pt lives with his wife (whom can provide assist upon D/C home) in a private home, 9 entry steps c R rail up, inside has 3 steps c R rail up. Pt has a tub/shower combo with a retractable shower head, standard toilet seat height, & no grab bar. Pt states he is currently independent with all functional mobility including community ambulation without device. Pt has own cane, rolling walker, and commode(all in good condition). Pt states he is independent with ADL's as well. Pt reports daily 1/10 pain at rest & states it is worse with any activity 7/10. Pt is right hand dominant, wears eye glasses, drives, & is retired.  Pt denies taking narcotics for pain management. Goal of therapy: manage pain & improve functional mobility.

## 2022-12-08 NOTE — ASU PREOP CHECKLIST - SPO2 (%)
0800- Bedside shift change report given to ENEIDA Dubon RN (oncoming nurse) by Tereza schrader RN (offgoing nurse). Report included the following information SBAR. 96

## 2022-12-08 NOTE — DISCHARGE NOTE PROVIDER - NSDCMRMEDTOKEN_GEN_ALL_CORE_FT
Centrum oral tablet: 1 tab(s) orally once a day  fenofibrate 145 mg oral tablet: 1 tab(s) orally once a day  levothyroxine 125 mcg (0.125 mg) oral tablet: 1 tab(s) orally once a day  Lipitor 40 mg oral tablet: 1 tab(s) orally once a day  olmesartan 20 mg oral tablet: 1 tab(s) orally once a day  perform COVID-19 PCR anytime from dec 4-dec 6:   potassium citrate 10 mEq oral tablet, extended release: 2  orally once a day  zolpidem 10 mg oral tablet: 1 tab(s) orally once a day (at bedtime)   acetaminophen 325 mg oral tablet: 3 tab(s) orally every 8 hours  Aspirin Enteric Coated 81 mg oral delayed release tablet: 1 tab(s) orally 2 times a day MDD:2  Centrum oral tablet: 1 tab(s) orally once a day  Dilaudid 2 mg oral tablet: -2 tab(s) orally every 3-4 hours, As Needed for pain MDD:6   fenofibrate 145 mg oral tablet: 1 tab(s) orally once a day  levothyroxine 125 mcg (0.125 mg) oral tablet: 1 tab(s) orally once a day  Lipitor 40 mg oral tablet: 1 tab(s) orally once a day  Narcan 4 mg/0.1 mL nasal spray: 4 milligram(s) intranasally once, repeat as necessary.   As needed. For suspected opiate overdose   Follow instructions on packet MDD:0.2 ml  olmesartan 20 mg oral tablet: 1 tab(s) orally once a day  pantoprazole 40 mg oral delayed release tablet: 1 tab(s) orally once a day (before a meal) MDD:1  polyethylene glycol 3350 oral powder for reconstitution: 17 gram(s) orally once a day (at bedtime)  potassium citrate 10 mEq oral tablet, extended release: 2  orally once a day  senna leaf extract oral tablet: 2 tab(s) orally once a day (at bedtime)  zolpidem 10 mg oral tablet: 1 tab(s) orally once a day (at bedtime)

## 2022-12-08 NOTE — PHYSICAL THERAPY INITIAL EVALUATION ADULT - PLANNED THERAPY INTERVENTIONS, PT EVAL
Pt will be able ot negotiate 8steps with right rail, left sided cane independently in 1 week/balance training/bed mobility training/gait training/ROM/strengthening/transfer training

## 2022-12-08 NOTE — DISCHARGE NOTE PROVIDER - NSDCFUADDAPPT_GEN_ALL_CORE_FT
Follow up with your surgeon in two weeks. Call for appointment.    If you need more pain medications, call your surgeon's office. For medication refills or authorizations call [phone]    Call and schedule a follow up appointment with your primary care physician for repeat blood work (CBC and BMP) for post hospital discharge follow-up care.    Call your surgeon if you have increased redness/pain/drainage or fever. Return to ER for shortness of breath/calf tenderness.

## 2022-12-08 NOTE — ASU PATIENT PROFILE, ADULT - NSICDXPASTMEDICALHX_GEN_ALL_CORE_FT
PAST MEDICAL HISTORY:  Benign neoplasm     Hyperlipidemia     Hypertension     Hypothyroid     Obese     Renal cancer, left denies chemo and radiation 8/2016     PAST MEDICAL HISTORY:  Benign neoplasm     Hyperlipidemia     Hypertension     Hypothyroid     Obese     Renal cancer, left denies chemo and radiation 8/2016    Sleep apnea not use CPAP

## 2022-12-08 NOTE — CONSULT NOTE ADULT - SUBJECTIVE AND OBJECTIVE BOX
ERIK CONTRERAS is a 67y Male s/p ROBOTIC ASSISTED RIGHT TOTAL KNEE ARTHROPLASTY WITH JHONATHAN      w/ h/o Hypothyroid    Hyperlipidemia    Hypertension    Renal cancer, left    Mass of left thigh    VEENA (obstructive sleep apnea)    Obese    Benign neoplasm    Sleep apnea      denies any chest pain shortness of breath palpitation dizziness lightheadedness nausea vomiting fever or chills    History of partial nephrectomy    Bladder tumor    History of bleeding ulcers    S/P left knee arthroscopy    S/P arthroscopy of right knee    Anal fissure    H/O melanoma excision    S/P total knee replacement, left      No pertinent family history in first degree relatives      SH: doesnot smoke or drink at this time    morphine (Nausea)  No Known Allergies    acetaminophen     Tablet .. 975 milliGRAM(s) Oral every 8 hours  acetaminophen   IVPB .. 1000 milliGRAM(s) IV Intermittent once  ceFAZolin   IVPB 2000 milliGRAM(s) IV Intermittent every 8 hours  HYDROmorphone   Tablet 2 milliGRAM(s) Oral every 4 hours PRN  HYDROmorphone   Tablet 4 milliGRAM(s) Oral every 4 hours PRN  magnesium hydroxide Suspension 30 milliLiter(s) Oral daily PRN  melatonin 3 milliGRAM(s) Oral at bedtime PRN  multivitamin 1 Tablet(s) Oral daily  ondansetron Injectable 4 milliGRAM(s) IV Push every 6 hours PRN  pantoprazole    Tablet 40 milliGRAM(s) Oral before breakfast  polyethylene glycol 3350 17 Gram(s) Oral at bedtime  senna 2 Tablet(s) Oral at bedtime  sodium chloride 0.9%. 1000 milliLiter(s) IV Continuous <Continuous>  zolpidem 5 milliGRAM(s) Oral at bedtime PRN    T(C): 36.7 (12-08-22 @ 21:30), Max: 36.7 (12-08-22 @ 09:15)  HR: 92 (12-08-22 @ 21:30) (67 - 99)  BP: 136/79 (12-08-22 @ 21:30) (73/41 - 139/85)  RR: 16 (12-08-22 @ 21:30) (14 - 18)  SpO2: 95% (12-08-22 @ 21:30) (94% - 98%)  HEENT unremarkable  neck no JVD or bruit  heart normal S1 S2 RRR no gallops or rubs  chest clear to auscultation  abd sof nontender non distended +bs  ext no calf tenderness    A/P   DVT PX  pain control  bowel regimen   wound care as per ortho  GI PX  antiemetics prn  incentive spirometer

## 2022-12-08 NOTE — PHYSICAL THERAPY INITIAL EVALUATION ADULT - RANGE OF MOTION, PT EVAL
Pt will be able to improve ROM of  right knee in all planes  to WFL  to improve independent in ADL, Gait in 4 weeks

## 2022-12-08 NOTE — PATIENT PROFILE ADULT - FALL HARM RISK - HARM RISK INTERVENTIONS
Assistance with ambulation/Assistance OOB with selected safe patient handling equipment/Communicate Risk of Fall with Harm to all staff/Discuss with provider need for PT consult/Monitor gait and stability/Provide patient with walking aids - walker, cane, crutches/Reinforce activity limits and safety measures with patient and family/Sit up slowly, dangle for a short time, stand at bedside before walking/Tailored Fall Risk Interventions/Use of alarms - bed, chair and/or voice tab/Visual Cue: Yellow wristband and red socks/Bed in lowest position, wheels locked, appropriate side rails in place/Call bell, personal items and telephone in reach/Instruct patient to call for assistance before getting out of bed or chair/Non-slip footwear when patient is out of bed/[place] to call system/Physically safe environment - no spills, clutter or unnecessary equipment/Purposeful Proactive Rounding/Room/bathroom lighting operational, light cord in reach

## 2022-12-08 NOTE — DISCHARGE NOTE PROVIDER - NSDCFUSCHEDAPPT_GEN_ALL_CORE_FT
Kenneth Reyes  [facility]  [address]   Scheduled Appointment: 12/08/2022    Kenneth Reyes  [facility]  [address]  Scheduled Appointment: 12/20/2022     Kenneth Reyes  [facility]  [address]  Scheduled Appointment: 12/20/2022

## 2022-12-08 NOTE — ASU PATIENT PROFILE, ADULT - NSICDXPASTSURGICALHX_GEN_ALL_CORE_FT
PAST SURGICAL HISTORY:  Anal fissure 3/2017    Bladder tumor s/p surgical excision 2016    H/O melanoma excision left arm    History of bleeding ulcers 2014, s/p endoscopy with clipping, secondary to mobic    History of partial nephrectomy left 2016    S/P arthroscopy of right knee     S/P left knee arthroscopy      PAST SURGICAL HISTORY:  Anal fissure 3/2017    Bladder tumor s/p surgical excision 2016    H/O melanoma excision left arm    History of bleeding ulcers 2014, s/p endoscopy with clipping, secondary to mobic    History of partial nephrectomy left 2016    S/P arthroscopy of right knee     S/P left knee arthroscopy     S/P total knee replacement, left

## 2022-12-08 NOTE — DISCHARGE NOTE PROVIDER - NSDCFUADDINST_GEN_ALL_CORE_FT
Keep Prineo Dressing Clean, Dry and Intact. May shower with Prineo Dressing. Please do not scrub, soak, peel or pick at the prineo dressing. No creams, lotions, or oils over dressing. May shower and let water run over incision, no baths. Pat dry once out of shower. Dressing to be removed in office at follow up visit in 2 weeks. There are no staples or stitches that need to be removed.  If you notice any redness, irritation, or itching around the prineo dressing call the surgeon's office    Per Dr. Reyes: may advance from walker as tolerated per discretion of physical therapist.     Keep knee straight while at rest. Elevate the leg as much as possible ("toes above the nose") to help control swelling. Make sure you get up and take a brief walk every two hours to help with circulation and prevent stiffness. Incentive spirometer 10X/hour. Cryocuff to help with pain/inflammation.

## 2022-12-08 NOTE — DISCHARGE NOTE PROVIDER - HOSPITAL COURSE
67yMale with history of OA presenting for R TKA by Dr. Reyes on 12/8/2022. Risk and benefits of surgery were explained to the patient. The patient understood and agreed to proceed with surgery. Patient underwent the procedure with no intraoperative complications. Pt was brought in stable condition to the PACU. Once stable in PACU, pt was brought to the floor. During hospital stay pt was followed by Medicine, physical therapy, Home Care during this admission. Pt had an uneventful hospital course. Pt is stable for discharge to home 67yMale with history of OA presenting for R TKA by Dr. Reyes on 12/8/2022. Risk and benefits of surgery were explained to the patient. The patient understood and agreed to proceed with surgery. Patient underwent the procedure with no intraoperative complications. Pt was brought in stable condition to the PACU. Once stable in PACU, pt was brought to the floor. During hospital stay pt was followed by Medicine, physical therapy, Home Care during this admission. Pt had an uneventful hospital course. Pt is stable for discharge to home on POD#1.

## 2022-12-08 NOTE — PHYSICAL THERAPY INITIAL EVALUATION ADULT - BALANCE TRAINING, PT EVAL
Pt will improve static & dynamic standing balance to Good using [Rolling walker] maintaining WB precaution  to perform ADL, Gait independently  in 1 week

## 2022-12-09 ENCOUNTER — TRANSCRIPTION ENCOUNTER (OUTPATIENT)
Age: 67
End: 2022-12-09

## 2022-12-09 VITALS
TEMPERATURE: 98 F | OXYGEN SATURATION: 96 % | RESPIRATION RATE: 15 BRPM | SYSTOLIC BLOOD PRESSURE: 123 MMHG | DIASTOLIC BLOOD PRESSURE: 70 MMHG | HEART RATE: 82 BPM

## 2022-12-09 LAB
ANION GAP SERPL CALC-SCNC: 6 MMOL/L — SIGNIFICANT CHANGE UP (ref 5–17)
BUN SERPL-MCNC: 21 MG/DL — SIGNIFICANT CHANGE UP (ref 7–23)
CALCIUM SERPL-MCNC: 9.1 MG/DL — SIGNIFICANT CHANGE UP (ref 8.5–10.1)
CHLORIDE SERPL-SCNC: 107 MMOL/L — SIGNIFICANT CHANGE UP (ref 96–108)
CO2 SERPL-SCNC: 24 MMOL/L — SIGNIFICANT CHANGE UP (ref 22–31)
CREAT SERPL-MCNC: 1.56 MG/DL — HIGH (ref 0.5–1.3)
EGFR: 48 ML/MIN/1.73M2 — LOW
GLUCOSE SERPL-MCNC: 126 MG/DL — HIGH (ref 70–99)
HCT VFR BLD CALC: 45.9 % — SIGNIFICANT CHANGE UP (ref 39–50)
HGB BLD-MCNC: 15.3 G/DL — SIGNIFICANT CHANGE UP (ref 13–17)
MCHC RBC-ENTMCNC: 29.3 PG — SIGNIFICANT CHANGE UP (ref 27–34)
MCHC RBC-ENTMCNC: 33.3 G/DL — SIGNIFICANT CHANGE UP (ref 32–36)
MCV RBC AUTO: 87.9 FL — SIGNIFICANT CHANGE UP (ref 80–100)
NRBC # BLD: 0 /100 WBCS — SIGNIFICANT CHANGE UP (ref 0–0)
PLATELET # BLD AUTO: 230 K/UL — SIGNIFICANT CHANGE UP (ref 150–400)
POTASSIUM SERPL-MCNC: 4 MMOL/L — SIGNIFICANT CHANGE UP (ref 3.5–5.3)
POTASSIUM SERPL-SCNC: 4 MMOL/L — SIGNIFICANT CHANGE UP (ref 3.5–5.3)
RBC # BLD: 5.22 M/UL — SIGNIFICANT CHANGE UP (ref 4.2–5.8)
RBC # FLD: 14.5 % — SIGNIFICANT CHANGE UP (ref 10.3–14.5)
SODIUM SERPL-SCNC: 137 MMOL/L — SIGNIFICANT CHANGE UP (ref 135–145)
WBC # BLD: 12.71 K/UL — HIGH (ref 3.8–10.5)
WBC # FLD AUTO: 12.71 K/UL — HIGH (ref 3.8–10.5)

## 2022-12-09 RX ORDER — NALOXONE HYDROCHLORIDE 4 MG/.1ML
4 SPRAY NASAL
Qty: 1 | Refills: 0
Start: 2022-12-09 | End: 2022-12-09

## 2022-12-09 RX ORDER — ACETAMINOPHEN 500 MG
3 TABLET ORAL
Qty: 0 | Refills: 0 | DISCHARGE
Start: 2022-12-09

## 2022-12-09 RX ORDER — ASPIRIN/CALCIUM CARB/MAGNESIUM 324 MG
1 TABLET ORAL
Qty: 60 | Refills: 0
Start: 2022-12-09 | End: 2023-01-07

## 2022-12-09 RX ORDER — POLYETHYLENE GLYCOL 3350 17 G/17G
17 POWDER, FOR SOLUTION ORAL
Qty: 0 | Refills: 0 | DISCHARGE
Start: 2022-12-09

## 2022-12-09 RX ORDER — SODIUM CHLORIDE 9 MG/ML
1000 INJECTION INTRAMUSCULAR; INTRAVENOUS; SUBCUTANEOUS ONCE
Refills: 0 | Status: COMPLETED | OUTPATIENT
Start: 2022-12-09 | End: 2022-12-09

## 2022-12-09 RX ORDER — SENNA PLUS 8.6 MG/1
2 TABLET ORAL
Qty: 0 | Refills: 0 | DISCHARGE
Start: 2022-12-09

## 2022-12-09 RX ORDER — PANTOPRAZOLE SODIUM 20 MG/1
1 TABLET, DELAYED RELEASE ORAL
Qty: 30 | Refills: 0
Start: 2022-12-09 | End: 2023-01-07

## 2022-12-09 RX ORDER — HYDROMORPHONE HYDROCHLORIDE 2 MG/ML
1 INJECTION INTRAMUSCULAR; INTRAVENOUS; SUBCUTANEOUS
Qty: 42 | Refills: 0
Start: 2022-12-09 | End: 2022-12-15

## 2022-12-09 RX ADMIN — HYDROMORPHONE HYDROCHLORIDE 4 MILLIGRAM(S): 2 INJECTION INTRAMUSCULAR; INTRAVENOUS; SUBCUTANEOUS at 04:15

## 2022-12-09 RX ADMIN — HYDROMORPHONE HYDROCHLORIDE 4 MILLIGRAM(S): 2 INJECTION INTRAMUSCULAR; INTRAVENOUS; SUBCUTANEOUS at 05:15

## 2022-12-09 RX ADMIN — Medication 975 MILLIGRAM(S): at 14:50

## 2022-12-09 RX ADMIN — Medication 81 MILLIGRAM(S): at 06:25

## 2022-12-09 RX ADMIN — HYDROMORPHONE HYDROCHLORIDE 4 MILLIGRAM(S): 2 INJECTION INTRAMUSCULAR; INTRAVENOUS; SUBCUTANEOUS at 13:53

## 2022-12-09 RX ADMIN — SODIUM CHLORIDE 500 MILLILITER(S): 9 INJECTION INTRAMUSCULAR; INTRAVENOUS; SUBCUTANEOUS at 08:23

## 2022-12-09 RX ADMIN — Medication 975 MILLIGRAM(S): at 07:14

## 2022-12-09 RX ADMIN — Medication 975 MILLIGRAM(S): at 06:25

## 2022-12-09 RX ADMIN — Medication 975 MILLIGRAM(S): at 13:53

## 2022-12-09 RX ADMIN — HYDROMORPHONE HYDROCHLORIDE 2 MILLIGRAM(S): 2 INJECTION INTRAMUSCULAR; INTRAVENOUS; SUBCUTANEOUS at 00:31

## 2022-12-09 RX ADMIN — HYDROMORPHONE HYDROCHLORIDE 2 MILLIGRAM(S): 2 INJECTION INTRAMUSCULAR; INTRAVENOUS; SUBCUTANEOUS at 06:27

## 2022-12-09 RX ADMIN — HYDROMORPHONE HYDROCHLORIDE 4 MILLIGRAM(S): 2 INJECTION INTRAMUSCULAR; INTRAVENOUS; SUBCUTANEOUS at 14:50

## 2022-12-09 RX ADMIN — Medication 100 MILLIGRAM(S): at 03:12

## 2022-12-09 RX ADMIN — HYDROMORPHONE HYDROCHLORIDE 4 MILLIGRAM(S): 2 INJECTION INTRAMUSCULAR; INTRAVENOUS; SUBCUTANEOUS at 09:14

## 2022-12-09 RX ADMIN — HYDROMORPHONE HYDROCHLORIDE 2 MILLIGRAM(S): 2 INJECTION INTRAMUSCULAR; INTRAVENOUS; SUBCUTANEOUS at 07:14

## 2022-12-09 RX ADMIN — Medication 1 TABLET(S): at 12:59

## 2022-12-09 RX ADMIN — PANTOPRAZOLE SODIUM 40 MILLIGRAM(S): 20 TABLET, DELAYED RELEASE ORAL at 06:25

## 2022-12-09 RX ADMIN — HYDROMORPHONE HYDROCHLORIDE 4 MILLIGRAM(S): 2 INJECTION INTRAMUSCULAR; INTRAVENOUS; SUBCUTANEOUS at 10:15

## 2022-12-09 RX ADMIN — SODIUM CHLORIDE 125 MILLILITER(S): 9 INJECTION INTRAMUSCULAR; INTRAVENOUS; SUBCUTANEOUS at 06:25

## 2022-12-09 RX ADMIN — Medication 102 MILLIGRAM(S): at 06:25

## 2022-12-09 RX ADMIN — HYDROMORPHONE HYDROCHLORIDE 2 MILLIGRAM(S): 2 INJECTION INTRAMUSCULAR; INTRAVENOUS; SUBCUTANEOUS at 01:30

## 2022-12-09 NOTE — DISCHARGE NOTE NURSING/CASE MANAGEMENT/SOCIAL WORK - PATIENT PORTAL LINK FT
You can access the FollowMyHealth Patient Portal offered by [facility] by registering at the following website: [email or web address]. By joining [facility]’s FollowMyHealth portal, you will also be able to view your health information using other applications (apps) compatible with our system.

## 2022-12-09 NOTE — DISCHARGE NOTE NURSING/CASE MANAGEMENT/SOCIAL WORK - NSDCFUADDAPPT_GEN_ALL_CORE_FT
Follow up with your surgeon in two weeks. Call for appointment.    If you need more pain medications, call your surgeon's office. For medication refills or authorizations call [phone]    Call and schedule a follow up appointment with your primary care physician for repeat blood work (CBC and BMP) for post hospital discharge follow-up care.    Call your surgeon if you have increased redness/pain/drainage or fever. Return to ER for shortness of breath/calf tenderness.

## 2022-12-09 NOTE — PROGRESS NOTE ADULT - SUBJECTIVE AND OBJECTIVE BOX
Patient is seen and examined at bedside. Denies CP/SOB/Dizziness/N/V/D/HA. Pain is controlled.     Vital Signs Last 24 Hrs  T(C): 37 (09 Dec 2022 06:30), Max: 37 (09 Dec 2022 06:30)  T(F): 98.6 (09 Dec 2022 06:30), Max: 98.6 (09 Dec 2022 06:30)  HR: 89 (09 Dec 2022 09:25) (67 - 99)  BP: 119/77 (09 Dec 2022 09:25) (73/41 - 139/85)  BP(mean): --  RR: 17 (09 Dec 2022 06:30) (14 - 18)  SpO2: 95% (09 Dec 2022 09:25) (94% - 98%)    Parameters below as of 09 Dec 2022 09:25  Patient On (Oxygen Delivery Method): room air          PHYSICAL EXAM:  General: NAD, WDWN.   Neuro:  Alert & responsive  HEENT: NCAT, EOMI, conjunctiva clear  abd: soft, NT/ND   Right LE: Prineo dressing C/D/I. Motor intact + EHL/FHL/TA/GS.  Sensation is grossly intact.  Extremity warm, compartments soft, compressible. No calf tenderness. DP 2+   Left LE: Motor intact +EHL/FHL/TA/GS. Sensation is grossly intact. Extremity warm, compartments soft, compressible. No calf tenderness. DP2+    Labs:                          15.3   12.71 )-----------( 230      ( 09 Dec 2022 06:10 )             45.9       12-09    137  |  107  |  21  ----------------------------<  126<H>  4.0   |  24  |  1.56<H>    Ca    9.1      09 Dec 2022 06:10        A/P: Patient is a 67y y/o Male s/p right TKA, POD # 1  -wound care, knee extension/leg elevation, cryocuff, isometric exercises, new medications reviewed with pt  -Pain control/analgesia discussed   -Inc spirometry reviewed with pt, demonstrated competence  -DVT prophylaxis with Venodynes/Aspirin  -CKD- returning to [facility]   -PT/OT/WBAT  -medical consult reviewed   -DC planning: home today with home care  -D/W Dr Reyes

## 2022-12-09 NOTE — PROGRESS NOTE ADULT - SUBJECTIVE AND OBJECTIVE BOX
ERIK CONTRERAS is a 67y Male s/p ROBOTIC ASSISTED RIGHT TOTAL KNEE ARTHROPLASTY WITH JHONATHAN        denies any chest pain shortness of breath palpitation dizziness lightheadedness nausea vomiting fever or chills    T(C): 37 (12-09-22 @ 06:30), Max: 37 (12-09-22 @ 06:30)  HR: 89 (12-09-22 @ 09:25) (67 - 99)  BP: 119/77 (12-09-22 @ 09:25) (73/41 - 139/85)  RR: 17 (12-09-22 @ 06:30) (14 - 18)  SpO2: 95% (12-09-22 @ 09:25) (94% - 98%)  no jvd/bruit  s1 s2 rrr  cta  s/nt/nd  no calf tend                        15.3   12.71 )-----------( 230      ( 09 Dec 2022 06:10 )             45.9   12-09    137  |  107  |  21  ----------------------------<  126<H>  4.0   |  24  |  1.56<H>    Ca    9.1      09 Dec 2022 06:10        cont dvt px  pain control  bowel regimen  antiemetics  incentive spirometer

## 2022-12-11 DIAGNOSIS — E78.5 HYPERLIPIDEMIA, UNSPECIFIED: ICD-10-CM

## 2022-12-11 DIAGNOSIS — G47.30 SLEEP APNEA, UNSPECIFIED: ICD-10-CM

## 2022-12-11 DIAGNOSIS — M17.11 UNILATERAL PRIMARY OSTEOARTHRITIS, RIGHT KNEE: ICD-10-CM

## 2022-12-11 DIAGNOSIS — E03.9 HYPOTHYROIDISM, UNSPECIFIED: ICD-10-CM

## 2022-12-11 DIAGNOSIS — D36.9 BENIGN NEOPLASM, UNSPECIFIED SITE: ICD-10-CM

## 2022-12-11 DIAGNOSIS — I10 ESSENTIAL (PRIMARY) HYPERTENSION: ICD-10-CM

## 2022-12-11 DIAGNOSIS — E66.9 OBESITY, UNSPECIFIED: ICD-10-CM

## 2022-12-19 ENCOUNTER — RX RENEWAL (OUTPATIENT)
Age: 67
End: 2022-12-19

## 2022-12-20 ENCOUNTER — APPOINTMENT (OUTPATIENT)
Dept: ORTHOPEDIC SURGERY | Facility: CLINIC | Age: 67
End: 2022-12-20

## 2022-12-20 VITALS — HEIGHT: 71 IN | BODY MASS INDEX: 39.9 KG/M2 | WEIGHT: 285 LBS

## 2022-12-20 PROCEDURE — 73562 X-RAY EXAM OF KNEE 3: CPT | Mod: RT

## 2022-12-20 PROCEDURE — 99024 POSTOP FOLLOW-UP VISIT: CPT

## 2022-12-21 PROBLEM — G47.30 SLEEP APNEA, UNSPECIFIED: Chronic | Status: ACTIVE | Noted: 2022-12-08

## 2022-12-21 RX ORDER — HYDROMORPHONE HYDROCHLORIDE 2 MG/1
2 TABLET ORAL EVERY 4 HOURS
Qty: 42 | Refills: 0 | Status: ACTIVE | COMMUNITY
Start: 2022-12-21 | End: 1900-01-01

## 2022-12-21 NOTE — HISTORY OF PRESENT ILLNESS
[7] : 7 [5] : 5 [Dull/Aching] : dull/aching [Constant] : constant [Household chores] : household chores [Leisure] : leisure [Sleep] : sleep [Meds] : meds [Ice] : ice [Lying in bed] : lying in bed [] : Post Surgical Visit: yes [de-identified] : 12/20/22: 2 weeks s/p R TKA doing well with good and bad days. He states the bottom of his feet are cold. Denies fevers/chills. Doing well with at home PT. \par \par Previous Doc: \par B/L knee pain for several years, worsening. Left worse than right. Mult HA injections with some relief but becoming less effective. Recent cortisone inj 2 weeks ago helped. Saw another doctor at \[facility]\"" who recc TKA. H/O GI bleed from NSAIDs x 2. 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 \par 4/14/21: left TKA\par 4/27/21: 13 days s/p left TKA- doing okay in some pain but mostly tolerable. Taking pain meds at night. No fevers or chills. Finishing up at home PT. Ambulating with cane.\par 5/25/21: 6 weeks postop improving with PT. Some lateral pain otherwise very happy.\par 6/22/21: 10 weeks postop min pain. PT helps.\par 9/27/22: 1.5 postop L TKA doing well with minimal pain and some tighness but overall back to normal acitivtes - Recently started to have right knee pain that has increased   [FreeTextEntry1] : Right knee [FreeTextEntry5] : ERIK CONTRERAS is a 67 year old M here for PO #1 for the right knee. Pt states his pain while sitting is okay, but trying to lay down results in the pain getting much worse. He says that physical therapy is helping. [FreeTextEntry7] : Pain sometimes radiates to right ankle [FreeTextEntry9] : Sitting [de-identified] : Standing after sitting [de-identified] : 12/19/22 [de-identified] : 12/8/22 [de-identified] : Right total knee arthroplasty

## 2022-12-21 NOTE — DISCUSSION/SUMMARY
[de-identified] : The incision was inspected and was clean and dry with no drainage.  The patient was instructed to call for fevers, chills, wound drainage, wound opening, redness, or any other concerns.\par \par Entered by uSjatha Landry acting as scribe.\par

## 2022-12-21 NOTE — ASSESSMENT
[FreeTextEntry1] : dv medial OA both knees. Discussed options - mult HA and cortisone injections in the past with mixed results. He would like to plan for left TKA. H/O GI bleed from NSAIDs. 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 Santana.\par 4/27/21: Nearly 2 weeks post op, in pain but doing well overall. ROM 3-90 today. Will begin out patient PT and continue HEP.\par 5/25/21: 6 weeks postop doing very well, cont PT.\par 6/22/21: 3 months postop min pain, cont HEP. Abx for dentist.\par 9/27/22: Advanced OA in right knee - He has failed conservative treatment and he has been delaying surgery. Would like to proceed with R TKA. L TKA doing well  - Risks and benefits discussed \par \par 12/20/22: 2 weeks postop R TKA. He has pain but overall doing well. Emphasized importance of elevation to reduce swelling. Incision and XR look good. Continue PT and follow up in 4 weeks. Using cane.

## 2022-12-21 NOTE — IMAGING
[de-identified] : Effected side: right \par Incision is clean and dry with no drainage - dressing removed -\par Sensation intact\par +2 edema LE\par Decreased ROM: 5-85\par \par Xray 3 views knee - Implants good position and well fixed - no fracture or dislocation\par

## 2022-12-27 ENCOUNTER — FORM ENCOUNTER (OUTPATIENT)
Age: 67
End: 2022-12-27

## 2023-01-23 ENCOUNTER — APPOINTMENT (OUTPATIENT)
Dept: ORTHOPEDIC SURGERY | Facility: CLINIC | Age: 68
End: 2023-01-23
Payer: COMMERCIAL

## 2023-01-23 VITALS — BODY MASS INDEX: 39.9 KG/M2 | WEIGHT: 285 LBS | HEIGHT: 71 IN

## 2023-01-23 PROCEDURE — 73562 X-RAY EXAM OF KNEE 3: CPT | Mod: RT

## 2023-01-23 PROCEDURE — 99024 POSTOP FOLLOW-UP VISIT: CPT

## 2023-01-23 NOTE — DISCUSSION/SUMMARY
[de-identified] : The incision was inspected and was clean and dry with no drainage.  The patient was instructed to call for fevers, chills, wound drainage, wound opening, redness, or any other concerns.\par \par Entered by Sujatha Landry acting as scribe.\par

## 2023-01-23 NOTE — ASSESSMENT
[FreeTextEntry1] : dv medial OA both knees. Discussed options - mult HA and cortisone injections in the past with mixed results. He would like to plan for left TKA. H/O GI bleed from NSAIDs. 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 Santana.\par 4/27/21: Nearly 2 weeks post op, in pain but doing well overall. ROM 3-90 today. Will begin out patient PT and continue HEP.\par 5/25/21: 6 weeks postop doing very well, cont PT.\par 6/22/21: 3 months postop min pain, cont HEP. Abx for dentist.\par 9/27/22: Advanced OA in right knee - He has failed conservative treatment and he has been delaying surgery. Would like to proceed with R TKA. L TKA doing well  - Risks and benefits discussed \par 12/20/22: 2 weeks postop R TKA. He has pain but overall doing well. Emphasized importance of elevation to reduce swelling. Incision and XR look good. Continue PT and follow up in 4 weeks. Using cane. \par \par 1/23/23: 7 weeks postop R TKA - He is overall doing okay but has some difficulty sleeping. He will finish out PT for the rest of the month and transition to HEP. Follow up in 4 weeks.

## 2023-01-23 NOTE — HISTORY OF PRESENT ILLNESS
[2] : 2 [Physical therapy] : physical therapy [Lying in bed] : lying in bed [de-identified] : 1/23/23: 7 weeks s/p R TKA he is doing well, mostly has pain at rest. Attending PT 2x a week with good benefit \par \par Previous Doc: \par B/L knee pain for several years, worsening. Left worse than right. Mult HA injections with some relief but becoming less effective. Recent cortisone inj 2 weeks ago helped. [facility] doctor [facility] who recc TKA. H/O GI bleed from NSAIDs x 2. 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 \par 4/14/21: left TKA\par 4/27/21: 13 days s/p left TKA- doing okay in some pain but mostly tolerable. Taking pain meds at night. No fevers or chills. Finishing up at home PT. Ambulating with cane.\par 5/25/21: 6 weeks postop improving with PT. Some lateral pain otherwise very happy.\par 6/22/21: 10 weeks postop min pain. PT helps.\par 9/27/22: 1.5 postop L TKA doing well with minimal pain and some tighness but overall back to normal acitivtes - Recently started to have right knee pain that has increased  \par 12/20/22: 2 weeks s/p R TKA doing well with good and bad days. He states the bottom of his feet are cold. Denies fevers/chills. Doing well with at home PT.  [] : no [FreeTextEntry1] : right knee  [FreeTextEntry5] : Pt is here for a post op visit for right knee. Currently doing physical therapy 2x a week, and finds it does help. Notes pain is slowly improving. States pain only when lying for prolonged time. Not taking medications, icing, or heating.  [de-identified] : 12/08/22 [de-identified] : RT TKA

## 2023-01-23 NOTE — IMAGING
[de-identified] : Effected side: right \par Incision well healed \par Sensation intact\par +1 edema LE\par Decreased ROM: \par \par Xray 3 views knee - Implants good position and well fixed - no fracture or dislocation\par

## 2023-02-13 ENCOUNTER — RX RENEWAL (OUTPATIENT)
Age: 68
End: 2023-02-13

## 2023-02-13 RX ORDER — MELOXICAM 15 MG/1
15 TABLET ORAL
Qty: 30 | Refills: 1 | Status: ACTIVE | COMMUNITY
Start: 2022-10-17 | End: 1900-01-01

## 2023-02-22 ENCOUNTER — APPOINTMENT (OUTPATIENT)
Dept: PAIN MANAGEMENT | Facility: CLINIC | Age: 68
End: 2023-02-22
Payer: COMMERCIAL

## 2023-02-22 PROCEDURE — A4550 SURGICAL TRAYS: CPT

## 2023-02-22 PROCEDURE — 62321 NJX INTERLAMINAR CRV/THRC: CPT

## 2023-02-22 NOTE — PROCEDURE
[FreeTextEntry3] : Date of Service: 02/22/2023 \par \par Account: [id number]\par \par Patient: ERIK CONTRERAS \par \par YOB: 1955\par \par Age: 67 year\par \par \par Surgeon: Demetrius Vail M.D.\par \par Pre-Operative Diagnosis: Cervical Radiculopathy\par \par Post Operative Diagnosis: Cervical Radiculopathy\par \par Procedure: Interlaminar cervical epidural steroid injection (C7-T1) under fluoroscopic guidance \par \par Anesthesia: MAC\par \par \par This procedure was carried out using fluoroscopic guidance.  The risks and benefits of the procedure were discussed extensively with the patient.  The consent of the patient was obtained and the following procedure was performed.\par \par The patient was placed in the prone position using a thoracic and chin support.  The cervical area was prepped and draped in a sterile fashion.  The fluoroscope visualized the C7-T1 interspace using slight cephalad-caudad angulation and this area was marked.  Using sterile technique the superficial skin was anesthetized with 1% Lidocaine without epinephrine.  A 18 gauge Tuohoy needle was advanced under fluoroscopy using wabza-[id number] technique until ligament was engaged.  The stilette was then removed and a column of preservative free normal saline flushed through the tuohoy needle and left with a concave fluid level above the butterfly portion of the tuohoy needle.  The needle was then advanced under fluoroscopic guidance until the column of saline disappeared.  Lateral view confirmed final needle tip placement in the epidural space.  After negative aspiration for heme and CSF, 1 cc of omnipaque confirmed good cervical epiduragram.  \par \par Cervical epidurogram showed no evidence of intrathecal or intravascular flow, and good bilateral epidural flow from C3 to T2 levels.  \par \par An injectate of 3cc of preservative free normal saline plus 12 mg of betamethasone was then injected into the epidural space. The needle was subsequently removed and pressure was applied.\par \par Anesthesia personnel were present throughout the procedure.  The patient tolerated the procedure well and was instructed to contact me immediately if there were any problems.\par \par \par Demetrius Vail M.D.\par

## 2023-02-27 ENCOUNTER — APPOINTMENT (OUTPATIENT)
Dept: ORTHOPEDIC SURGERY | Facility: CLINIC | Age: 68
End: 2023-02-27
Payer: COMMERCIAL

## 2023-02-27 VITALS — WEIGHT: 285 LBS | HEIGHT: 71 IN | BODY MASS INDEX: 39.9 KG/M2

## 2023-02-27 DIAGNOSIS — Z96.651 PRESENCE OF RIGHT ARTIFICIAL KNEE JOINT: ICD-10-CM

## 2023-02-27 DIAGNOSIS — M17.11 UNILATERAL PRIMARY OSTEOARTHRITIS, RIGHT KNEE: ICD-10-CM

## 2023-02-27 PROCEDURE — 99024 POSTOP FOLLOW-UP VISIT: CPT

## 2023-02-27 NOTE — ASSESSMENT
[FreeTextEntry1] : Previous doc:\par dv medial OA both knees. Discussed options - mult HA and cortisone injections in the past with mixed results. He would like to plan for left TKA. H/O GI bleed from NSAIDs. 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 Santana.\par 4/27/21: Nearly 2 weeks post op, in pain but doing well overall. ROM 3-90 today. Will begin out patient PT and continue HEP.\par 5/25/21: 6 weeks postop doing very well, cont PT.\par 6/22/21: 3 months postop min pain, cont HEP. Abx for dentist.\par 9/27/22: Advanced OA in right knee - He has failed conservative treatment and he has been delaying surgery. Would like to proceed with R TKA. L TKA doing well  - Risks and benefits discussed \par 12/20/22: 2 weeks postop R TKA. He has pain but overall doing well. Emphasized importance of elevation to reduce swelling. Incision and XR look good. Continue PT and follow up in 4 weeks. Using cane. \par 1/23/23: 7 weeks postop R TKA - He is overall doing okay but has some difficulty sleeping. He will finish out PT for the rest of the month and transition to HEP. Follow up in 4 weeks. \par \par 2/27/23: Right TKA doing well but peristent pain throughout leg seems neurologic, has MRI 2020 with stenosis - recc new MRI eval worsening stenosis and refer to pain management for possible injections as these have helped him previously.

## 2023-02-27 NOTE — PHYSICAL EXAM
[de-identified] : Right knee: Inc healed.  ROM 0-110.  No effusion.  Lig stable.  Walks without assistance.

## 2023-02-27 NOTE — DISCUSSION/SUMMARY
[de-identified] : The patient was advised of the diagnosis.  The natural history of the pathology was explained in full to the patient in layman's terms. All questions were answered.  The risks and benefits of surgical and non-surgical treatment alternatives were explained in full to the patient.\par

## 2023-02-27 NOTE — IMAGING
[de-identified] : Effected side: right \par Incision well healed \par Sensation intact\par +1 edema LE\par Decreased ROM: \par \par Xray 3 views knee - Implants good position and well fixed - no fracture or dislocation\par

## 2023-02-27 NOTE — HISTORY OF PRESENT ILLNESS
[2] : 2 [Physical therapy] : physical therapy [Lying in bed] : lying in bed [de-identified] : 2/27/23: 3 months postop - knee is doing ok but worsening pain throughout right leg, feels like nerve pain.  Has back pain - MRI 2020 showing stenosis and had ESIs in the past with relief.\par \par Previous Doc: \par B/L knee pain for several years, worsening. Left worse than right. Mult HA injections with some relief but becoming less effective. Recent cortisone inj 2 weeks ago helped. Saw another doctor at [facility] who recc TKA. H/O GI bleed from NSAIDs x 2. 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 \par 4/14/21: left TKA\par 4/27/21: 13 days s/p left TKA- doing okay in some pain but mostly tolerable. Taking pain meds at night. No fevers or chills. Finishing up at home PT. Ambulating with cane.\par 5/25/21: 6 weeks postop improving with PT. Some lateral pain otherwise very happy.\par 6/22/21: 10 weeks postop min pain. PT helps.\par 9/27/22: 1.5 postop L TKA doing well with minimal pain and some tighness but overall back to normal acitivtes - Recently started to have right knee pain that has increased  \par 12/20/22: 2 weeks s/p R TKA doing well with good and bad days. He states the bottom of his feet are cold. Denies fevers/chills. Doing well with at home PT. \par 1/23/23: 7 weeks s/p R TKA he is doing well, mostly has pain at rest. Attending PT 2x a week with good benefit  [] : no [FreeTextEntry1] : right knee  [FreeTextEntry5] : Pt is here for a post op visit for right knee. Currently doing physical therapy 2x a week, and finds it does help. Notes pain is slowly improving. States pain only when lying for prolonged time. Not taking medications, icing, or heating.  [de-identified] : 12/08/22 [de-identified] : RT TKA

## 2023-02-28 DIAGNOSIS — M54.16 RADICULOPATHY, LUMBAR REGION: ICD-10-CM

## 2023-03-05 ENCOUNTER — FORM ENCOUNTER (OUTPATIENT)
Age: 68
End: 2023-03-05

## 2023-03-06 ENCOUNTER — APPOINTMENT (OUTPATIENT)
Dept: MRI IMAGING | Facility: CLINIC | Age: 68
End: 2023-03-06
Payer: COMMERCIAL

## 2023-03-06 PROCEDURE — 72148 MRI LUMBAR SPINE W/O DYE: CPT

## 2023-03-09 ENCOUNTER — APPOINTMENT (OUTPATIENT)
Dept: PAIN MANAGEMENT | Facility: CLINIC | Age: 68
End: 2023-03-09
Payer: COMMERCIAL

## 2023-03-09 VITALS — BODY MASS INDEX: 39.9 KG/M2 | HEIGHT: 71 IN | WEIGHT: 285 LBS

## 2023-03-09 PROCEDURE — 99214 OFFICE O/P EST MOD 30 MIN: CPT

## 2023-03-09 NOTE — HISTORY OF PRESENT ILLNESS
[Neck] : neck [Radiating] : radiating [Tingling] : tingling [Intermittent] : intermittent [Injection therapy] : injection therapy [FreeTextEntry1] : 3/9/23: patient is following up after epidural states it helped him he is just feeling a bit of pins and needles  [] : no [FreeTextEntry7] : right arm  [de-identified] : radom, sleeping on it the wrong way

## 2023-03-09 NOTE — DISCUSSION/SUMMARY
[de-identified] : 80% relief of pain and adls post melvin with imporvmeent in rom and sleep hygiene\par I personally reviewed the MRI/CT scan images and agree with the radiologist's report. The radiological findings were discussed with the patient\par right ls radic\par right l5-s1 hnp \par proceed with right l5-s1 and s1 tfesi

## 2023-03-09 NOTE — PHYSICAL EXAM
[place] left upper lid [de-identified] : PHYSICAL EXAM\par \par Constitutional: \par Appears well, no apparent distress\par Ability to communicate: Normal\par Respiratory: non-labored breathing\par Skin: no rash noted\par Head: normocephalic, atraumatic\par Neck: no visible thyroid enlargement\par Eyes: extraocular movements intact\par Neurologic: alert and oriented x3\par Psychiatric: normal mood, affect, and behavior\par \par Lumbar Spine: \par Palpation: right lumbar paraspinal spasm and right lumbar paraspinal tenderness to palpation.\par ROM: Diminished range of motion in all plains.  Patient notes pain with lateral bending to the right.\par MMT: Motor exam is 5/5 through out bilateral lower extremities.\par Sensation: Light touch and pain is intact throughout bilateral lower extremities.\par Reflexes: achilles and patella reflexes are intact and  symmetrical.  No sustained clonus.\par Special Testing: Positive straight leg raise on the right side.\par \par Assessemnt:\par Radiculopathy of lumbosacral region (M54.17)\par Myalgia (M79.10)\par \par Plan:\par After discussing various treatment options with the patient including but not limited to oral medications, physical therapy, exercise modalities as well as interventional spinal injections, we have decided with the following plan:\par The patient is presenting with acute/sub-acute radicular pain with impairment in ADLs and functionality.  The pain has not responded to conservative care including NSAID therapy and/or physical therapy.  There is no bleeding tendency, unstable medical condition, or systemic infection\par \par The risks, benefits and alternatives of the proposed procedure were explained in detail with the patient.  The risks outlined include but are not limited to infection, bleeding, post dural puncture headache, nerve injury, a temporary increase in pain, failure to resolve symptoms, allergic reaction, symptom recurrence, and possible elevation of blood sugar.  All questions were answered to patient's satisfaction and he/she verbalized an understanding.\par \par Follow up 1-2 weeks post injection foe re-evaluation.\par \par Continue home exercises, stretching, activity modification, physical therapy, and conservative care.\par \par \par

## 2023-03-21 ENCOUNTER — APPOINTMENT (OUTPATIENT)
Age: 68
End: 2023-03-21
Payer: COMMERCIAL

## 2023-03-21 PROCEDURE — 64483 NJX AA&/STRD TFRM EPI L/S 1: CPT | Mod: RT

## 2023-03-21 PROCEDURE — 64484 NJX AA&/STRD TFRM EPI L/S EA: CPT | Mod: 59,RT

## 2023-03-22 ENCOUNTER — APPOINTMENT (OUTPATIENT)
Dept: PAIN MANAGEMENT | Facility: CLINIC | Age: 68
End: 2023-03-22

## 2023-04-06 ENCOUNTER — APPOINTMENT (OUTPATIENT)
Dept: PAIN MANAGEMENT | Facility: CLINIC | Age: 68
End: 2023-04-06
Payer: COMMERCIAL

## 2023-04-06 VITALS — BODY MASS INDEX: 39.9 KG/M2 | WEIGHT: 285 LBS | HEIGHT: 71 IN

## 2023-04-06 PROCEDURE — 99214 OFFICE O/P EST MOD 30 MIN: CPT

## 2023-04-06 NOTE — PHYSICAL EXAM
[de-identified] : PHYSICAL EXAM\par \par Constitutional: \par Appears well, no apparent distress\par Ability to communicate: Normal\par Respiratory: non-labored breathing\par Skin: no rash noted\par Head: normocephalic, atraumatic\par Neck: no visible thyroid enlargement\par Eyes: extraocular movements intact\par Neurologic: alert and oriented x3\par Psychiatric: normal mood, affect, and behavior\par \par Lumbar Spine: \par Palpation: right lumbar paraspinal spasm and right lumbar paraspinal tenderness to palpation.\par ROM: Diminished range of motion in all plains.  Patient notes pain with lateral bending to the right.\par MMT: Motor exam is 5/5 through out bilateral lower extremities.\par Sensation: Light touch and pain is intact throughout bilateral lower extremities.\par Reflexes: achilles and patella reflexes are intact and  symmetrical.  No sustained clonus.\par Special Testing: Positive straight leg raise on the right side.\par \par Assessemnt:\par Radiculopathy of lumbosacral region (M54.17)\par Myalgia (M79.10)\par \par Plan:\par After discussing various treatment options with the patient including but not limited to oral medications, physical therapy, exercise modalities as well as interventional spinal injections, we have decided with the following plan:\par The patient is presenting with acute/sub-acute radicular pain with impairment in ADLs and functionality.  The pain has not responded to conservative care including NSAID therapy and/or physical therapy.  There is no bleeding tendency, unstable medical condition, or systemic infection\par \par The risks, benefits and alternatives of the proposed procedure were explained in detail with the patient.  The risks outlined include but are not limited to infection, bleeding, post dural puncture headache, nerve injury, a temporary increase in pain, failure to resolve symptoms, allergic reaction, symptom recurrence, and possible elevation of blood sugar.  All questions were answered to patient's satisfaction and he/she verbalized an understanding.\par \par Follow up 1-2 weeks post injection foe re-evaluation.\par \par Continue home exercises, stretching, activity modification, physical therapy, and conservative care.\par \par \par

## 2023-04-06 NOTE — REASON FOR VISIT
[Follow-Up Visit] : a follow-up pain management visit [FreeTextEntry2] : RT L5-S1,S1 TFESI, ( DOS 03/21/23- MAC )

## 2023-04-06 NOTE — DISCUSSION/SUMMARY
[Surgical risks reviewed] : Surgical risks reviewed [de-identified] : will proceed R L5-S1 S1 TFESI prn\par After discussing various treatment options with the patient including but not limited to oral medications, physical therapy, exercise, modalities as well as interventional spinal injections, we have decided with the following plan:\par I personally reviewed the MRI/CT scan images and agree with the radiologist's report. The radiological findings were discussed with the patient.\par The risks, benefits, contents and alternatives to injection were explained in full to the patient. Risks outlined include but are not limited to infection,sepsis, bleeding, post-dural puncture headache, nerve damage, temporary increase in pain, syncopal episode, failure to resolve symptoms, allergic reaction, symptom recurrence, and elevation of blood sugar in diabetics. Cortisone may cause immunosuppression. Patient understands the risks. All questions were answered. After discussion of options, patient requested an injection. Information regarding the injection was given to the patient. Which medications to stop prior to the injection was explained to the patient as well.\par Follow up in 1-2 weeks post injection for re-evaluation.\par Continue Home exercises, stretching, activity modification, physical therapy, and conservative care.\par Patient is presenting with acute/sub-acute radicular pain with impairment in ADLs and functionality. The pain has not responded to conservative care including nsaid therapy and/or physical therapy. There is no bleeding tendency, unstable medical condition, or systemic infection.\par

## 2023-04-06 NOTE — HISTORY OF PRESENT ILLNESS
[Neck] : neck [Radiating] : radiating [Tingling] : tingling [Intermittent] : intermittent [Injection therapy] : injection therapy [Lower back] : lower back [3] : 3 [2] : 2 [Dull/Aching] : dull/aching [FreeTextEntry1] : pt is following up after procedure states that it helped and he is not having pain at the moment  [] : no [de-identified] : radom, sleeping on it the wrong way

## 2023-04-28 NOTE — DISCHARGE NOTE ADULT - COMMUNITY RESOURCES
Outreach attempt was made to schedule a Medicare Wellness Visit. This was the first attempt. Contact was made, MWV appointment scheduled.  
[facility] [id number] [phone]  for your rolling walker

## 2023-05-05 ENCOUNTER — APPOINTMENT (OUTPATIENT)
Dept: ORTHOPEDIC SURGERY | Facility: CLINIC | Age: 68
End: 2023-05-05
Payer: COMMERCIAL

## 2023-05-05 VITALS — BODY MASS INDEX: 39.9 KG/M2 | HEIGHT: 71 IN | WEIGHT: 285 LBS

## 2023-05-05 DIAGNOSIS — Z96.652 PRESENCE OF LEFT ARTIFICIAL KNEE JOINT: ICD-10-CM

## 2023-05-05 PROCEDURE — 99214 OFFICE O/P EST MOD 30 MIN: CPT

## 2023-05-05 RX ORDER — TRAMADOL HYDROCHLORIDE 50 MG/1
50 TABLET, COATED ORAL
Qty: 30 | Refills: 0 | Status: ACTIVE | COMMUNITY
Start: 2023-05-05 | End: 1900-01-01

## 2023-05-05 NOTE — IMAGING
[de-identified] : Right knee: Well healed scar. ROM 0-110.  No effusion.  Lig stable.  Walks without assistance.

## 2023-05-05 NOTE — DISCUSSION/SUMMARY
[de-identified] : The patient was advised of the diagnosis.  The natural history of the pathology was explained in full to the patient in layman's terms. All questions were answered.  The risks and benefits of surgical and non-surgical treatment alternatives were explained in full to the patient.\par \par Progress note completed by Mayda Burris PA-C.

## 2023-05-05 NOTE — HISTORY OF PRESENT ILLNESS
[8] : 8 [2] : 2 [Physical therapy] : physical therapy [Lying in bed] : lying in bed [de-identified] : 5/5/23: Here for f/up R knee and L-spine. He had the LESI with Dr. Vail which has been very beneficial. He is scheduled for another inj next week. Overall the knee is doing well, but notes pain with pivoting \par \par Previous Doc: \par B/L knee pain for several years, worsening. Left worse than right. Mult HA injections with some relief but becoming less effective. Recent cortisone inj 2 weeks ago helped. Saw another doctor at [facility] who recc TKA. H/O GI bleed from NSAIDs x 2. 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 \par 4/14/21: left TKA\par 4/27/21: 13 days s/p left TKA- doing okay in some pain but mostly tolerable. Taking pain meds at night. No fevers or chills. Finishing up at home PT. Ambulating with cane.\par 5/25/21: 6 weeks postop improving with PT. Some lateral pain otherwise very happy.\par 6/22/21: 10 weeks postop min pain. PT helps.\par 9/27/22: 1.5 postop L TKA doing well with minimal pain and some tighness but overall back to normal acitivtes - Recently started to have right knee pain that has increased  \par 12/20/22: 2 weeks s/p R TKA doing well with good and bad days. He states the bottom of his feet are cold. Denies fevers/chills. Doing well with at home PT. \par 1/23/23: 7 weeks s/p R TKA he is doing well, mostly has pain at rest. Attending PT 2x a week with good benefit \par 2/27/23: 3 months postop - knee is doing ok but worsening pain throughout right leg, feels like nerve pain.  Has back pain - MRI 2020 showing stenosis and had ESIs in the past with relief. [] : no [FreeTextEntry1] : right knee  [FreeTextEntry5] : 05/05/23: Pt complaining of Rt Knee and Rt Lower back pain ongoing for a couple weeks. ROM normal. Walking normal. Pt compliant with Physical therapy twice week.Pt seen by Pain management had Inj and gave relief. \par Pt is here for a post op visit for right knee. Currently doing physical therapy 2x a week, and finds it does help. Notes pain is slowly improving. States pain only when lying for prolonged time. Not taking medications, icing, or heating.  [de-identified] : PT, Pain Management  [de-identified] : 12/08/22 [de-identified] : RT TKA

## 2023-05-05 NOTE — ASSESSMENT
[FreeTextEntry1] : Previous doc:\par dv medial OA both knees. Discussed options - mult HA and cortisone injections in the past with mixed results. He would like to plan for left TKA. H/O GI bleed from NSAIDs. 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 Santana.\par 4/27/21: Nearly 2 weeks post op, in pain but doing well overall. ROM 3-90 today. Will begin out patient PT and continue HEP.\par 5/25/21: 6 weeks postop doing very well, cont PT.\par 6/22/21: 3 months postop min pain, cont HEP. Abx for dentist.\par 9/27/22: Advanced OA in right knee - He has failed conservative treatment and he has been delaying surgery. Would like to proceed with R TKA. L TKA doing well  - Risks and benefits discussed \par 12/20/22: 2 weeks postop R TKA. He has pain but overall doing well. Emphasized importance of elevation to reduce swelling. Incision and XR look good. Continue PT and follow up in 4 weeks. Using cane. \par 1/23/23: 7 weeks postop R TKA - He is overall doing okay but has some difficulty sleeping. He will finish out PT for the rest of the month and transition to HEP. Follow up in 4 weeks. \par 2/27/23: Right TKA doing well but peristent pain throughout leg seems neurologic, has MRI 2020 with stenosis - recc new MRI eval worsening stenosis and refer to pain management for possible injections as these have helped him previously.\par \par 5/5/23: Pain is improving in his back and knee. His pain is likely originating from his back. Tramadol rx.

## 2023-05-07 ENCOUNTER — FORM ENCOUNTER (OUTPATIENT)
Age: 68
End: 2023-05-07

## 2023-05-12 ENCOUNTER — APPOINTMENT (OUTPATIENT)
Dept: PAIN MANAGEMENT | Facility: CLINIC | Age: 68
End: 2023-05-12
Payer: COMMERCIAL

## 2023-05-12 PROCEDURE — 64484 NJX AA&/STRD TFRM EPI L/S EA: CPT | Mod: 59,RT

## 2023-05-12 PROCEDURE — 64483 NJX AA&/STRD TFRM EPI L/S 1: CPT | Mod: RT

## 2023-05-12 PROCEDURE — A4550 SURGICAL TRAYS: CPT

## 2023-05-12 NOTE — PROCEDURE
[FreeTextEntry3] : Date of Service: 05/12/2023 \par \par Account: [id number]\par \par Patient: ERIK CONTRERAS \par \par YOB: 1955\par \par Age: 67 year\par \par \par Surgeon: Demetrius Vail M.D.\par \par Assistant: None.\par \par Pre-Operative Diagnosis: Lumbosacral radiculitis\par \par Post Operative Diagnosis: Lumbosacral radiculitis\par \par Procedure: Right L5-S1, S1 transforaminal epidural steroid injection under fluoroscopic guidance. \par \par Anesthesia:      MAC\par \par \par This procedure was carried out using fluoroscopic guidance.  The risks and benefits of the procedure were discussed extensively with the patient.  The consent of the patient was obtained and the following procedure was performed. The patient was placed in the prone position on the fluoroscopic table and the lumbar area was prepped and draped in a sterile fashion.\par \par The right L5-S1 neural foramen was then identified on right oblique  "belen dog" anatomical view at the 6 o' clock position using fluoroscopic guidance, and the area was marked. The overlying skin and subcutaneous structures were anesthetized using sterile technique with 1% Lidocaine.  A 22 gauge spinal needle was directed toward the inferior (6 o'clock) position of the pedicle, which formed the roof of the identified foramen.  Once in the epidural space, after negative aspiration for heme and CSF, 1cc of Omnipaque contrast was injected to confirm epidural location and assess filling defects and rule out intravascular needle placement. \par \par The following contrast flow and epidurogram was observed: no intravascular or intrathecal flow pattern was noted.  No blood or CSF was aspirated. Omnipaque spread appeared to outline the right L5 nerve root and spread medially into the epidural space.  \par \par After this, an injectate of 3 cc preservative free normal saline plus 40 mg of kenalog was injected in the epidural space.\par \par The right S1 neural foramen was then identified on right oblique anatomical view at the 6 o' clock position of the S1 pedicle using fluoroscopic guidance, and the area was marked. The overlying skin and subcutaneous structures were anesthetized using sterile technique with 1% Lidocaine.  A 22 gauge spinal needle was directed toward the inferior (6 o'clock) position of the pedicle, which formed the roof of the identified foramen.  Once in the epidural space, after negative aspiration for heme and CSF, 1cc of Omnipaque contrast was injected to confirm epidural location and assess filling defects and rule out intravascular needle placement. \par \par The following contrast flow and epidurogram was observed: no intravascular or intrathecal flow pattern was noted.  No blood or CSF was aspirated. Omnipaque spread appeared to outline the right S1 nerve root and spread medially into the epidural space.  \par \par After this, an injectate of 3 cc preservative free normal saline plus 40 mg of kenalog was injected in the epidural space.\par \par The needle was subsequently removed.  Vital signs remained normal.  Pulse oximeter was used throughout the procedure and the patient's pulse and oxygen saturation remained within normal limits.  The patient tolerated the procedure well.  There were no complications.  The patient was instructed to apply ice over the injection sites for twenty minutes every two hours for the next 24 to 48 hours.  The patient was also instructed to contact me immediately if there were any problems.\par \par \par Demetrius Vail M.D.\par \par

## 2023-05-26 ENCOUNTER — APPOINTMENT (OUTPATIENT)
Dept: PAIN MANAGEMENT | Facility: CLINIC | Age: 68
End: 2023-05-26

## 2023-06-28 ENCOUNTER — APPOINTMENT (OUTPATIENT)
Dept: PAIN MANAGEMENT | Facility: CLINIC | Age: 68
End: 2023-06-28
Payer: COMMERCIAL

## 2023-06-28 DIAGNOSIS — M54.17 RADICULOPATHY, LUMBOSACRAL REGION: ICD-10-CM

## 2023-06-28 PROCEDURE — 99214 OFFICE O/P EST MOD 30 MIN: CPT

## 2023-06-28 NOTE — PHYSICAL EXAM
[de-identified] : PHYSICAL EXAM\par \par Constitutional: \par Appears well, no apparent distress\par Ability to communicate: Normal\par Respiratory: non-labored breathing\par Skin: no rash noted\par Head: normocephalic, atraumatic\par Neck: no visible thyroid enlargement\par Eyes: extraocular movements intact\par Neurologic: alert and oriented x3\par Psychiatric: normal mood, affect, and behavior\par \par Lumbar Spine: \par Palpation: right lumbar paraspinal spasm and right lumbar paraspinal tenderness to palpation.\par ROM: Diminished range of motion in all plains.  Patient notes pain with lateral bending to the right.\par MMT: Motor exam is 5/5 through out bilateral lower extremities.\par Sensation: Light touch and pain is intact throughout bilateral lower extremities.\par Reflexes: achilles and patella reflexes are intact and  symmetrical.  No sustained clonus.\par Special Testing: Positive straight leg raise on the right side.\par \par Assessemnt:\par Radiculopathy of lumbosacral region (M54.17)\par Myalgia (M79.10)\par \par Plan:\par After discussing various treatment options with the patient including but not limited to oral medications, physical therapy, exercise modalities as well as interventional spinal injections, we have decided with the following plan:\par The patient is presenting with acute/sub-acute radicular pain with impairment in ADLs and functionality.  The pain has not responded to conservative care including NSAID therapy and/or physical therapy.  There is no bleeding tendency, unstable medical condition, or systemic infection\par \par The risks, benefits and alternatives of the proposed procedure were explained in detail with the patient.  The risks outlined include but are not limited to infection, bleeding, post dural puncture headache, nerve injury, a temporary increase in pain, failure to resolve symptoms, allergic reaction, symptom recurrence, and possible elevation of blood sugar.  All questions were answered to patient's satisfaction and he/she verbalized an understanding.\par \par Follow up 1-2 weeks post injection foe re-evaluation.\par \par Continue home exercises, stretching, activity modification, physical therapy, and conservative care.\par \par \par

## 2023-06-28 NOTE — HISTORY OF PRESENT ILLNESS
[Neck] : neck [Lower back] : lower back [3] : 3 [2] : 2 [Dull/Aching] : dull/aching [Radiating] : radiating [Tingling] : tingling [Intermittent] : intermittent [Injection therapy] : injection therapy [] : no [de-identified] : radom, sleeping on it the wrong way

## 2023-06-28 NOTE — DISCUSSION/SUMMARY
[Surgical risks reviewed] : Surgical risks reviewed [de-identified] : can repeat R L5-S1 S1 TFESI prn\par \par After discussing various treatment options with the patient including but not limited to oral medications, physical therapy, exercise,\par modalities as well as interventional spinal injections, we have decided with the following plan\par \par I personally reviewed the MRI/CT scan images and agree with the radiologist's report. The\par radiological findings were discussed with the patient.\par \par \par The risks, benefits, contents and alternatives to injection were explained in full to the patient. Risks outlined include but are not\par limited to infection,sepsis, bleeding, post-dural puncture headache, nerve damage, temporary increase in pain, syncopal episode,\par failure to resolve symptoms, allergic reaction, symptom recurrence, and elevation of blood sugar in diabetics. Cortisone may cause\par immunosuppression. Patient understands the risks. All questions were answered. After discussion of options, patient requested\par an injection. Information regarding the injection was given to the patient. Which medications to stop prior to the injection was\par explained to the patient as well.\par \par Follow up in 1-2 weeks post injection for re-evaluation.\par \par  Conservative Care\par Continue Home exercises, stretching, activity modification, physical therapy, and conservative care.\par

## 2023-08-04 ENCOUNTER — APPOINTMENT (OUTPATIENT)
Dept: ORTHOPEDIC SURGERY | Facility: CLINIC | Age: 68
End: 2023-08-04

## 2023-11-03 ENCOUNTER — APPOINTMENT (OUTPATIENT)
Dept: PAIN MANAGEMENT | Facility: CLINIC | Age: 68
End: 2023-11-03
Payer: COMMERCIAL

## 2023-11-03 VITALS — BODY MASS INDEX: 39.9 KG/M2 | HEIGHT: 71 IN | WEIGHT: 285 LBS

## 2023-11-03 PROCEDURE — 99214 OFFICE O/P EST MOD 30 MIN: CPT

## 2023-11-20 ENCOUNTER — APPOINTMENT (OUTPATIENT)
Dept: PAIN MANAGEMENT | Facility: CLINIC | Age: 68
End: 2023-11-20
Payer: COMMERCIAL

## 2023-11-20 PROCEDURE — 62321 NJX INTERLAMINAR CRV/THRC: CPT

## 2023-11-20 PROCEDURE — A4550 SURGICAL TRAYS: CPT

## 2023-12-08 ENCOUNTER — APPOINTMENT (OUTPATIENT)
Dept: PAIN MANAGEMENT | Facility: CLINIC | Age: 68
End: 2023-12-08
Payer: COMMERCIAL

## 2023-12-08 VITALS — HEIGHT: 71 IN | BODY MASS INDEX: 39.9 KG/M2 | WEIGHT: 285 LBS

## 2023-12-08 DIAGNOSIS — M54.12 RADICULOPATHY, CERVICAL REGION: ICD-10-CM

## 2023-12-08 DIAGNOSIS — M54.2 CERVICALGIA: ICD-10-CM

## 2023-12-08 PROCEDURE — 99213 OFFICE O/P EST LOW 20 MIN: CPT

## 2024-03-12 NOTE — REASON FOR VISIT
History   No chief complaint on file.    HPI  Jonnathan Samuel is a 61 year old male who presents urgent care with concern over several day history of fatigue, nasal congestion, cough, chest tightness, wheezing.  He denies any objective fever, significant sore throat, shortness of breath, vomiting, diarrhea or abdominal pain.  He states concern that his wife was recently diagnosed with pneumonia.  No known exposures to influenza, COVID-19.     Allergies:  No Known Allergies    Problem List:    Patient Active Problem List    Diagnosis Date Noted    Benign essential hypertension 01/21/2020     Priority: Medium        Past Medical History:    No past medical history on file.    Past Surgical History:    Past Surgical History:   Procedure Laterality Date    COLONOSCOPY N/A 9/6/2019    Procedure: COLONOSCOPY;  Surgeon: Frandy Cabrera MD;  [facility]: [facility]       Family History:    Family History   Problem Relation Age of Onset    Heart Disease Mother         CHF       Social History:  Marital Status:   [2]  Social History     Tobacco Use    Smoking status: Never    Smokeless tobacco: Never   Vaping Use    Vaping Use: Never used   Substance Use Topics    Alcohol use: Yes     Comment: occassional    Drug use: No        Medications:    albuterol (PROAIR HFA/PROVENTIL HFA/VENTOLIN HFA) 108 (90 Base) MCG/ACT inhaler  lisinopril (ZESTRIL) 20 MG tablet      Review of Systems  CONSTITUTIONAL:POSITIVE  for chills, fatigue, myalgias and NEGATIVE  for fever  INTEGUMENTARY/SKIN: NEGATIVE for worrisome rashes, moles or lesions  EYES: NEGATIVE for vision changes or irritation  ENT/MOUTH: POSITIVE for nasal congestion and NEGATIVE for sore throat, ear pain   RESP:POSITIVE for cough, chest tightness, wheezing   GI: NEGATIVE for vomiting, diarrhea, abdominal pain   Physical Exam   BP: (!) 138/95  Pulse: 96  Temp: 98.3  F (36.8  C)  Resp: 16  SpO2: 98 %  Physical Exam  GENERAL APPEARANCE: healthy, alert and no distress  EYES: EOMI,   PERRL, conjunctiva clear  HENT: ear canals and TM's normal.  Nose and mouth without ulcers, erythema or lesions  NECK: supple, nontender, no lymphadenopathy  RESP: lungs clear to auscultation - no rales, rhonchi or wheezes  CV: regular rates and rhythm, normal S1 S2, no murmur noted  SKIN: no suspicious lesions or rashes  ED Course        Procedures       Critical Care time:  none        Results for orders placed or performed during the hospital encounter of 03/12/24   Symptomatic Influenza A/B, RSV, & SARS-CoV2 PCR (COVID-19) Nose     Status: Normal    Specimen: Nose; Swab   Result Value Ref Range    Influenza A PCR Negative Negative    Influenza B PCR Negative Negative    RSV PCR Negative Negative    SARS CoV2 PCR Negative Negative    Narrative    Testing was performed using the Xpert Xpress CoV2/Flu/RSV Assay on the Cepheid GeneXpert Instrument. This test should be ordered for the detection of SARS-CoV-2, influenza, and RSV viruses in individuals who meet clinical and/or epidemiological criteria. Test performance is unknown in asymptomatic patients. This test is for in vitro diagnostic use under the FDA EUA for laboratories certified under CLIA to perform high or moderate complexity testing. This test has not been FDA cleared or approved. A negative result does not rule out the presence of PCR inhibitors in the specimen or target RNA in concentration below the limit of detection for the assay. If only one viral target is positive but coinfection with multiple targets is suspected, the sample should be re-tested with another FDA cleared, approved, or authorized test, if coinfection would change clinical management. This test was validated by the [facility] [employer]. These laboratories are certified under the Clinical Laboratory Improvement Amendments of 1988 (CLIA-88) as qualified to perform high complexity laboratory testing.     Medications - No data to display    Assessments & Plan (with Medical  Decision Making)     I have reviewed the nursing notes.    I have reviewed the findings, diagnosis, plan and need for follow up with the patient.       New Prescriptions    ALBUTEROL (PROAIR HFA/PROVENTIL HFA/VENTOLIN HFA) 108 (90 BASE) MCG/ACT INHALER    Inhale 2 puffs into the lungs every 6 hours as needed       Final diagnoses:   Viral URI with cough     61-year-old female presents urgent care with concern over several day history of fatigue, nasal congestion, cough, chest tightness, wheezing.  He elevated blood pressure upon arrival, remainder vital signs stable.  Physical exam findings included lungs which are clear to auscultation no wheezing rales or rhonchi.  As part of evaluation he had negative COVID, flu, RSV testing.  I do not suspect pneumonia and he agreed to defer chest x-ray at this time.  I similarly do not suspect bronchitis, pertussis, PE.  He was discharged home stable with instructions for symptomatic treatment, prescription for albuterol inhaler given.  Follow-up if no improvement within the next week.  Worrisome reasons to return to the ER/UC sooner discussed.    Disclaimer: This note consists of symbols derived from keyboarding, dictation, and/or voice recognition software. As a result, there may be errors in the script that have gone undetected.  Please consider this when interpreting information found in the chart.      3/12/2024   [facility] EMERGENCY DEPT       Cierra Bishop PA-C  03/14/24 2020     [Follow-Up Visit] : a follow-up pain management visit [FreeTextEntry2] : RT L5-S1,S1 TFESI(DOS 05/12/23-MAC )

## 2024-04-11 NOTE — OCCUPATIONAL THERAPY INITIAL EVALUATION ADULT - STRENGTHENING, PT EVAL
2nd rabies shot
Patient will increase right lower extremity strength to 5/5 to improve functional strength needed to engage in functional tasks by 4 weeks

## 2024-05-29 ENCOUNTER — RX RENEWAL (OUTPATIENT)
Age: 69
End: 2024-05-29

## 2024-05-29 RX ORDER — GABAPENTIN 300 MG/1
300 CAPSULE ORAL TWICE DAILY
Qty: 60 | Refills: 0 | Status: ACTIVE | COMMUNITY
Start: 2023-09-18 | End: 1900-01-01

## 2024-06-19 NOTE — PATIENT PROFILE ADULT - FUNCTIONAL ASSESSMENT - BASIC MOBILITY SCORE.
Patient woke from sleeping and requested morning medications. RN reheated patient's breakfast. Patient stated they did not sleep well last night due to nightmares. Stated they took PRN hydroxyzine, but did not find it helpful. Pleasant and cooperative during interactions. Patient returned to recliner chair in milieu after eating breakfast, and was observed watching TV.    18

## 2024-08-26 ENCOUNTER — RX RENEWAL (OUTPATIENT)
Age: 69
End: 2024-08-26

## 2024-08-27 ENCOUNTER — TRANSCRIPTION ENCOUNTER (OUTPATIENT)
Age: 69
End: 2024-08-27

## 2024-09-09 NOTE — OCCUPATIONAL THERAPY INITIAL EVALUATION ADULT - DEEP PRESSURE SENSATION, RLE, OT EVAL
Patient's daughter called to verify an appointment with Dr. Farr. I saw her MRA is scheduled but no follow up with Dr. Farr. She stated there may have been a follow up in October. Please advise.    within normal limits

## 2024-09-11 ENCOUNTER — APPOINTMENT (OUTPATIENT)
Dept: PAIN MANAGEMENT | Facility: CLINIC | Age: 69
End: 2024-09-11
Payer: MEDICARE

## 2024-09-11 VITALS — WEIGHT: 281 LBS | BODY MASS INDEX: 39.34 KG/M2 | HEIGHT: 71 IN

## 2024-09-11 DIAGNOSIS — M54.17 RADICULOPATHY, LUMBOSACRAL REGION: ICD-10-CM

## 2024-09-11 PROCEDURE — 99203 OFFICE O/P NEW LOW 30 MIN: CPT

## 2024-09-11 NOTE — ASSESSMENT
[FreeTextEntry1] : states that he is stable with gabapentin and meloxicam does not need ERICKA at this time

## 2024-09-11 NOTE — HISTORY OF PRESENT ILLNESS
[Upper back] : upper back [Lower back] : lower back [6] : 6 [3] : 3 [Burning] : burning [Dull/Aching] : dull/aching [Radiating] : radiating [Tingling] : tingling [Intermittent] : intermittent [Sleep] : sleep [Injection therapy] : injection therapy [Lying in bed] : lying in bed [FreeTextEntry1] : pt presents lspine radiating into b/l legs and cspine pain [7] : 7 [] : no [FreeTextEntry6] : numbness  [FreeTextEntry7] : right side  [de-identified] : radom, sleeping on it the wrong way

## 2024-09-11 NOTE — PHYSICAL EXAM
[de-identified] : PHYSICAL EXAM  Constitutional:  Appears well, no apparent distress Ability to communicate: Normal Respiratory: non-labored breathing Skin: no rash noted Head: normocephalic, atraumatic Neck: no visible thyroid enlargement Eyes: extraocular movements intact Neurologic: alert and oriented x3 Psychiatric: normal mood, affect, and behavior  Lumbar Spine:  Palpation: left and right lumbar paraspinal spasm and left and right lumbar paraspinal tenderness to palpation. ROM: Diminished range of motion in all plains.  Patient notes pain with lateral bending to the left and right. MMT: Motor exam is 5/5 through out bilateral lower extremities. Sensation: Light touch and pain is intact throughout bilateral lower extremities. Reflexes: achilles and patella reflexes are intact and  symmetrical.  No sustained clonus. Special Testing: Positive straight leg raise on the left and right side.  Assessemnt: Radiculopathy of lumbosacral region (M54.17) Myalgia (M79.10)

## 2024-09-11 NOTE — PHYSICAL EXAM
[de-identified] : PHYSICAL EXAM  Constitutional:  Appears well, no apparent distress Ability to communicate: Normal Respiratory: non-labored breathing Skin: no rash noted Head: normocephalic, atraumatic Neck: no visible thyroid enlargement Eyes: extraocular movements intact Neurologic: alert and oriented x3 Psychiatric: normal mood, affect, and behavior  Lumbar Spine:  Palpation: left and right lumbar paraspinal spasm and left and right lumbar paraspinal tenderness to palpation. ROM: Diminished range of motion in all plains.  Patient notes pain with lateral bending to the left and right. MMT: Motor exam is 5/5 through out bilateral lower extremities. Sensation: Light touch and pain is intact throughout bilateral lower extremities. Reflexes: achilles and patella reflexes are intact and  symmetrical.  No sustained clonus. Special Testing: Positive straight leg raise on the left and right side.  Assessemnt: Radiculopathy of lumbosacral region (M54.17) Myalgia (M79.10)

## 2024-09-11 NOTE — HISTORY OF PRESENT ILLNESS
[Upper back] : upper back [Lower back] : lower back [6] : 6 [3] : 3 [Burning] : burning [Dull/Aching] : dull/aching [Radiating] : radiating [Tingling] : tingling [Intermittent] : intermittent [Sleep] : sleep [Injection therapy] : injection therapy [Lying in bed] : lying in bed [FreeTextEntry1] : pt presents lspine radiating into b/l legs and cspine pain [7] : 7 [] : no [FreeTextEntry6] : numbness  [FreeTextEntry7] : right side  [de-identified] : radom, sleeping on it the wrong way

## 2024-10-08 ENCOUNTER — NON-APPOINTMENT (OUTPATIENT)
Age: 69
End: 2024-10-08

## 2025-03-12 ENCOUNTER — RX RENEWAL (OUTPATIENT)
Age: 70
End: 2025-03-12

## 2025-03-12 NOTE — PATIENT PROFILE ADULT - NS PRO AD PATIENT TYPE
[address] [facility] [place] [address]  Phone [phone]  Fax [phone]  [email or web address]    Dr. Josue’s Post-Operative Instructions    PRESCRIPTION MEDICATIONS  Tylenol Extra-Strength (Acetaminophen 500 mg or 650 mg)  Plan to stay on a scheduled dose of 1-2 tablets every 4-6 hrs initially  DO NOT exceed 4,000 mg of Acetaminophen in a 24 hour period    Oxycodone:  This is a narcotic medication for pain.   This medication is to be taken AS NEEDED IN ADDITION TO SCHEDULED TYLENOL.   Only take if pain not controlled with Tylenol   Do not drive or drink alcohol WHILE taking this medication.    Colace or Sennokot  This medication is to help with constipation, a common side effect after taking narcotic pain medications (like Norco/Percocet) and general anesthesia.   Take 1 pill in the morning and 1 in the evening to prevent constipation.   Discontinue this medication once you have had a normal bowel movement.  It is normal to take several days to make a bowel movement after surgery.  Drink plenty of clear liquids as the anesthesia can cause dehydration/constipation as well.    Aspirin (325mg pills)  This medication is prevent blood clots in your operative extremity  Take 1 tab twice daily for 4 weeks    Celebrex   This is a strong anti-inflammatory medication to help with pain and swelling.   Stay on scheduled dosage for 2 weeks, then can continue as needed    Pantoprazole   This is a medication you will take for 30 days after surgery to help protect your stomach     WOUND CARE  Your surgical dressing will stay on until you leave the hospital. The dressing can be removed after 5-7 days.  You can remove the silver dressing after 5-7 days. Once this is removed, you can shower and allow water to run over the incision. Do not scrub the incision.   Do not apply any ointment to the incision.  After 5-7 days, you should continue to cover the incision with light gauze or island dressing.  There is a Zip-line  Skin closure device over the surgical incision that will be removed at the first post-operative office visit  DO NOT soak in any pool/bath water until a minimum of 4 weeks after surgery.    HOME PHYSICAL THERAPY  The hospital discharge planner will coordinate home physical therapy. They will come to your house 2-3 times a week to teach you exercise that you should do everyday.     POSITIONING  Do not sleep or rest with a pillow under your knee. This could prevent you from obtaining fully straight leg.   When sitting or laying down, position a pillow under your heal to allow your knee to suspend. This will force your knee to fully straighten.     WEIGHTBEARING & ASSIST DEVICES  You may bear weight as tolerated onto your operative extremity (unless otherwise intstructed)  This means that you may put as much weight onto your operative extremity as you can tolerate the pain.   Your therapist will help you transition from a walker/crutches to a cane and eventually ambulating without any assist devices.     ICE PACK  Use it as much as you can for the first 72 hours.   Try to use it 4-5 times per day after the first 72 hours for the first two weeks after surgery, then use it as needed.  A frozen 3lb bag of rice acts as an excellent ice-pack.     FOLLOW UP  You will need to follow up in clinic in 2 weeks  If you do not see an appointment listed above, please contact the office to schedule this.     WHEN SHOULD YOU CONTACT THE OFFICE?  If you have a fever >100.4 degrees F.   A low-grade temperature (even up to 100 degrees) is expected after surgery, but let us know if it gets this high!  If you develop chills or sweats.   If you have pus, significant pain, significant tenderness, or redness surrounding the incision site.  If you have numbness, tingling, or weakness on your affected extremity that was not present before surgery.  If you are unable to urinate >1-2 days after surgery.    IMPORTANT CONTACT INFORMATION  Office  Phone Numbers: Ask for Polina (Dr. Josue’s )  [phone] ([facility]) - [phone]    Dr. Josue e-mail (for postoperative questions)- [email or web address]           Health Care Proxy (HCP)

## 2025-05-07 ENCOUNTER — RX RENEWAL (OUTPATIENT)
Age: 70
End: 2025-05-07

## 2025-06-02 NOTE — H&P PST ADULT - FUNCTIONAL ASSESSMENT - DAILY ACTIVITY PT AGE POP HIDDEN
Please advise -     Pt would like another Allergy referral. 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    Adult

## (undated) DEVICE — DRSG ACE BANDAGE 6"

## (undated) DEVICE — MAKO CHECKPOINT KIT FEMORAL / TIBIAL

## (undated) DEVICE — DRSG TELFA 3 X 8

## (undated) DEVICE — PACK TOTAL KNEE

## (undated) DEVICE — SAW BLADE STRYKER SAGITTAL 25X0.89X75MM

## (undated) DEVICE — TOURNIQUET ESMARK 6"

## (undated) DEVICE — SUT VICRYL 0 27" CT-1 UNDYED

## (undated) DEVICE — PREP SCRUB BRUSH W CHG 4%

## (undated) DEVICE — ZIMMER PULSAVAC PLUS FAN KIT

## (undated) DEVICE — GLV 8.5 PROTEXIS (WHITE)

## (undated) DEVICE — DRAPE STERI-DRAPE INCISE 19X17"

## (undated) DEVICE — CRYO/CUFF GRAVITY COOLER KNEE LARGE

## (undated) DEVICE — MAKO VIZADISC KNEE TRACKING KIT

## (undated) DEVICE — SYR LUER LOK 20CC

## (undated) DEVICE — NDL HYPO SAFE 22G X 1.5" (BLACK)

## (undated) DEVICE — SUCTION TIP KAMVAC MINI

## (undated) DEVICE — WARMING BLANKET UPPER ADULT

## (undated) DEVICE — DRSG DERMABOND PRINEO 22CM

## (undated) DEVICE — SUT STRATAFIX SPIRAL MONOCRYL PLUS 4-0 45CM PS-2 UNDYED

## (undated) DEVICE — SUT STRATAFIX SYMMETRIC PDS PLUS 1 18" CTX VIOLET

## (undated) DEVICE — MIXER BONE CEMENT EVAC III

## (undated) DEVICE — MEDICATION LABELS W MARKER

## (undated) DEVICE — MAKO DRAPE KIT

## (undated) DEVICE — MAKO BLADE STANDARD

## (undated) DEVICE — HOOD T5 PEELAWAY

## (undated) DEVICE — DRAPE SURGICAL #1010

## (undated) DEVICE — SUT STRATAFIX SPIRAL PDS PLUS 2-0 45CM CT-1

## (undated) DEVICE — VENODYNE/SCD SLEEVE CALF MEDIUM

## (undated) DEVICE — SOL IRR BAG NS 0.9% 3000ML